# Patient Record
Sex: FEMALE | Race: BLACK OR AFRICAN AMERICAN | NOT HISPANIC OR LATINO | Employment: OTHER | URBAN - METROPOLITAN AREA
[De-identification: names, ages, dates, MRNs, and addresses within clinical notes are randomized per-mention and may not be internally consistent; named-entity substitution may affect disease eponyms.]

---

## 2019-02-25 DIAGNOSIS — I25.10 CORONARY ARTERY DISEASE, ANGINA PRESENCE UNSPECIFIED, UNSPECIFIED VESSEL OR LESION TYPE, UNSPECIFIED WHETHER NATIVE OR TRANSPLANTED HEART: Primary | ICD-10-CM

## 2019-04-02 ENCOUNTER — DOCUMENTATION ONLY (OUTPATIENT)
Dept: CARDIOLOGY | Facility: CLINIC | Age: 49
End: 2019-04-02

## 2019-04-02 ENCOUNTER — OFFICE VISIT (OUTPATIENT)
Dept: CARDIOLOGY | Facility: CLINIC | Age: 49
End: 2019-04-02
Payer: MEDICAID

## 2019-04-02 VITALS
HEIGHT: 63 IN | WEIGHT: 226.19 LBS | OXYGEN SATURATION: 97 % | BODY MASS INDEX: 40.08 KG/M2 | HEART RATE: 93 BPM | SYSTOLIC BLOOD PRESSURE: 117 MMHG | DIASTOLIC BLOOD PRESSURE: 80 MMHG

## 2019-04-02 DIAGNOSIS — I10 HYPERTENSION, UNSPECIFIED TYPE: ICD-10-CM

## 2019-04-02 DIAGNOSIS — E11.42 TYPE 2 DIABETES MELLITUS WITH DIABETIC POLYNEUROPATHY, WITH LONG-TERM CURRENT USE OF INSULIN: ICD-10-CM

## 2019-04-02 DIAGNOSIS — Z79.4 TYPE 2 DIABETES MELLITUS WITH DIABETIC POLYNEUROPATHY, WITH LONG-TERM CURRENT USE OF INSULIN: ICD-10-CM

## 2019-04-02 DIAGNOSIS — I25.110 ATHEROSCLEROSIS OF NATIVE CORONARY ARTERY OF NATIVE HEART WITH UNSTABLE ANGINA PECTORIS: ICD-10-CM

## 2019-04-02 DIAGNOSIS — E66.01 MORBID OBESITY: ICD-10-CM

## 2019-04-02 PROBLEM — E11.9 TYPE 2 DIABETES MELLITUS: Status: ACTIVE | Noted: 2019-04-02

## 2019-04-02 PROCEDURE — 99999 PR PBB SHADOW E&M-EST. PATIENT-LVL III: ICD-10-PCS | Mod: PBBFAC,,, | Performed by: INTERNAL MEDICINE

## 2019-04-02 PROCEDURE — 99213 OFFICE O/P EST LOW 20 MIN: CPT | Mod: PBBFAC | Performed by: INTERNAL MEDICINE

## 2019-04-02 PROCEDURE — 99204 PR OFFICE/OUTPT VISIT, NEW, LEVL IV, 45-59 MIN: ICD-10-PCS | Mod: S$PBB,,, | Performed by: INTERNAL MEDICINE

## 2019-04-02 PROCEDURE — 99999 PR PBB SHADOW E&M-EST. PATIENT-LVL III: CPT | Mod: PBBFAC,,, | Performed by: INTERNAL MEDICINE

## 2019-04-02 PROCEDURE — 99204 OFFICE O/P NEW MOD 45 MIN: CPT | Mod: S$PBB,,, | Performed by: INTERNAL MEDICINE

## 2019-04-02 RX ORDER — INSULIN GLARGINE 100 [IU]/ML
INJECTION, SOLUTION SUBCUTANEOUS
COMMUNITY
Start: 2017-12-21

## 2019-04-02 RX ORDER — HYDROCHLOROTHIAZIDE 25 MG/1
TABLET ORAL
Status: ON HOLD | COMMUNITY
Start: 2018-08-20 | End: 2019-04-04 | Stop reason: HOSPADM

## 2019-04-02 RX ORDER — HYDROCODONE BITARTRATE AND ACETAMINOPHEN 5; 325 MG/1; MG/1
1 TABLET ORAL
Status: ON HOLD | COMMUNITY
Start: 2018-09-05 | End: 2019-04-04 | Stop reason: HOSPADM

## 2019-04-02 RX ORDER — ALBUTEROL SULFATE 5 MG/ML
2.5 SOLUTION RESPIRATORY (INHALATION)
COMMUNITY
Start: 2017-03-29

## 2019-04-02 RX ORDER — RANOLAZINE 1000 MG/1
500 TABLET, EXTENDED RELEASE ORAL 2 TIMES DAILY
COMMUNITY

## 2019-04-02 RX ORDER — CALCIUM CARB/VITAMIN D3/VIT K1 500-100-40
TABLET,CHEWABLE ORAL
Status: ON HOLD | COMMUNITY
Start: 2017-07-25 | End: 2019-04-04 | Stop reason: HOSPADM

## 2019-04-02 RX ORDER — LISINOPRIL 40 MG/1
40 TABLET ORAL DAILY
Refills: 5 | Status: ON HOLD | COMMUNITY
Start: 2019-03-24 | End: 2019-04-04 | Stop reason: HOSPADM

## 2019-04-02 RX ORDER — SODIUM CHLORIDE 9 MG/ML
3 INJECTION, SOLUTION INTRAVENOUS CONTINUOUS
Status: CANCELLED | OUTPATIENT
Start: 2019-04-02 | End: 2019-04-02

## 2019-04-02 RX ORDER — CLOPIDOGREL BISULFATE 75 MG/1
1 TABLET ORAL
COMMUNITY
Start: 2017-12-21

## 2019-04-02 RX ORDER — DIPHENHYDRAMINE HCL 25 MG
50 CAPSULE ORAL ONCE
Status: CANCELLED | OUTPATIENT
Start: 2019-04-02 | End: 2019-04-02

## 2019-04-02 RX ORDER — PROMETHAZINE HYDROCHLORIDE 25 MG/1
25 TABLET ORAL EVERY 6 HOURS PRN
Refills: 1 | COMMUNITY
Start: 2019-03-13

## 2019-04-02 RX ORDER — OMEPRAZOLE 40 MG/1
40 CAPSULE, DELAYED RELEASE ORAL DAILY
Status: ON HOLD | COMMUNITY
End: 2019-04-04 | Stop reason: HOSPADM

## 2019-04-02 RX ORDER — NITROGLYCERIN 0.4 MG/1
0.4 TABLET SUBLINGUAL
COMMUNITY
Start: 2018-04-04

## 2019-04-02 RX ORDER — ATORVASTATIN CALCIUM 80 MG/1
80 TABLET, FILM COATED ORAL
COMMUNITY
Start: 2017-12-21

## 2019-04-02 RX ORDER — LANCETS 33 GAUGE
EACH MISCELLANEOUS
Refills: 3 | Status: ON HOLD | COMMUNITY
Start: 2019-03-10 | End: 2019-04-04 | Stop reason: HOSPADM

## 2019-04-02 RX ORDER — NAPROXEN SODIUM 220 MG/1
1 TABLET, FILM COATED ORAL
COMMUNITY
Start: 2017-12-21

## 2019-04-02 RX ORDER — AMLODIPINE BESYLATE 10 MG/1
1 TABLET ORAL
COMMUNITY
Start: 2018-08-20

## 2019-04-02 RX ORDER — TRAZODONE HYDROCHLORIDE 50 MG/1
50 TABLET ORAL NIGHTLY PRN
Refills: 3 | COMMUNITY
Start: 2019-03-13

## 2019-04-02 RX ORDER — METOPROLOL TARTRATE 50 MG/1
75 TABLET ORAL
COMMUNITY
Start: 2018-04-04

## 2019-04-02 RX ORDER — POTASSIUM CHLORIDE 750 MG/1
10 CAPSULE, EXTENDED RELEASE ORAL ONCE
Status: ON HOLD | COMMUNITY
End: 2019-04-04 | Stop reason: HOSPADM

## 2019-04-02 NOTE — ASSESSMENT & PLAN NOTE
Pt CAD with multiple PCI and multiple episodes of recurrent ISR she was transferred here for brachytherapy.     1. Cardiac catheterization with probable PCI.   2. Antiplatelets: ASA/Plavix  3. Access: Right Radial, 5Fr Slender  4. Pt is a ANIRUDH candidate and understands the importance of taking plavix for at least one year, understands that in case of receiving a drug coated stent the failure to comply with dual anti-platelet therapy is likely to result in stent clothing, heart attack and death.   5. The risks, benefits, and alternatives of coronary vascular angiography and possible intervention were discussed with the patient. All questions were answered and informed consent was obtained. I had a detailed discussion with the patient regarding risk of stroke, MI, bleeding access site complications including limb loss, allergy, kidney failure including dialysis and death.  6. The patient understands the risks and benefits and wishes to go ahead with the procedure.  7. All patient's questions were answered

## 2019-04-02 NOTE — LETTER
April 2, 2019      Shashank Weir MD  [address]  [address]  [facility] [address]           [facility]  [address]  [facility] [address]  Phone: [phone]          Patient: Libia Wynn   MR Number: [id number]   YOB: 1970   Date of Visit: 4/2/2019       Dear Dr. Shashank Weir:    Thank you for referring Libia Wynn to me for evaluation. Attached you will find relevant portions of my assessment and plan of care.    If you have questions, please do not hesitate to call me. I look forward to following Libia Wynn along with you.    Sincerely,    Thad Kirk MD    Enclosure  CC:  No Recipients    If you would like to receive this communication electronically, please contact [email or web address] or [phone] to request more information on [employer] access.    For providers and/or their staff who would like to refer a patient to [facility], please contact us through our one-stop-shop provider referral line, [facility] [place], at [phone].    If you feel you have received this communication in error or would no longer like to receive these types of communications, please e-mail [email or web address]

## 2019-04-02 NOTE — PROGRESS NOTES
Interventional Cardiology Clinic Note  Reason for Visit: Brachytherapy    HPI:   Pt is a 49 year old lady who was referred by Dr. Shashank Weir for Brachytherapy     She has a hx DMII. HTN, Obesity and CAD s/p PCI x 8. She had PCI to LCx in 2010 then had PCI to PLB and LAD in 2016. Had ISR of LAD in 2017 and had a 3.0 Xience placed then. In 2018 had chest pain and was noted to have recurrent ISR of mLAD stents as well as a  of PLB stents so she had laser atherectomy and PTCA of her ISR. Had restenosis in September of 2018 with repeat stenting then in November of 2018 had PCTA. She has recurrent chest pain with exertion and in the middle of the night.    Review of Systems   Constitution: Negative for decreased appetite.   HENT: Negative for congestion and hearing loss.    Eyes: Negative for double vision.   Cardiovascular: Negative for chest pain and palpitations.   Respiratory: Negative for cough and shortness of breath.    Hematologic/Lymphatic: Does not bruise/bleed easily.   Gastrointestinal: Negative for abdominal pain, nausea and vomiting.   Neurological: Negative for dizziness, light-headedness and weakness.       PMH:   HTN, DMII, CAD.    Allergies:   Review of patient's allergies indicates:  No Known Allergies  Medications:     Current Outpatient Medications on File Prior to Visit   Medication Sig Dispense Refill    albuterol (PROVENTIL) 5 mg/mL nebulizer solution Inhale 2.5 mg into the lungs.      amLODIPine (NORVASC) 10 MG tablet Take 1 tablet by mouth.      aspirin 81 MG Chew Take 1 tablet by mouth.      atorvastatin (LIPITOR) 80 MG tablet Take 80 mg by mouth.      calcium-vitamin D (OSCAL) 250 (625)-125 mg-unit per tablet Take 1 tablet by mouth once daily.      clopidogrel (PLAVIX) 75 mg tablet Take 1 tablet by mouth.      hydroCHLOROthiazide (HYDRODIURIL) 25 MG tablet TAKE ONE TABLET BY MOUTH ONCE DAILY      HYDROcodone-acetaminophen (NORCO) 5-325 mg per tablet Take 1 tablet by mouth.    "   insulin glargine (LANTUS U-100 INSULIN) 100 unit/mL injection Inject into the skin.      insulin syringe-needle U-100 0.3 mL 31 gauge x 5/16" Syrg       lancets 33 gauge Misc NEED DIRECTIONS  3    lisinopril (PRINIVIL,ZESTRIL) 40 MG tablet Take 40 mg by mouth once daily.  5    metoprolol tartrate (LOPRESSOR) 50 MG tablet Take 75 mg by mouth.      nitroGLYCERIN (NITROSTAT) 0.4 MG SL tablet Place 0.4 mg under the tongue.      omeprazole (PRILOSEC) 40 MG capsule Take 40 mg by mouth once daily.      potassium chloride (MICRO-K) 10 MEQ CpSR Take 10 mEq by mouth once.      promethazine (PHENERGAN) 25 MG tablet Take 25 mg by mouth every 6 (six) hours as needed.  1    ranolazine (RANEXA) 1,000 mg Tb12 Take 500 mg by mouth 2 (two) times daily.      SITagliptan-metformin (JANUMET) 50-1,000 mg per tablet Take 1 tablet by mouth.      traZODone (DESYREL) 50 MG tablet Take 50 mg by mouth nightly as needed.  3     No current facility-administered medications on file prior to visit.      Social History:     Social History     Tobacco Use    Smoking status: Former Smoker     Last attempt to quit: 2017     Years since quittin.0    Smokeless tobacco: Never Used   Substance Use Topics    Alcohol use: Never     Frequency: Never     Family History:   History reviewed. No pertinent family history.    Physical Exam  /80 (BP Location: Right arm, Patient Position: Sitting, BP Method: Large (Automatic))   Pulse 93   Ht 5' 3" (1.6 m)   Wt 102.6 kg (226 lb 3.1 oz)   SpO2 97%   BMI 40.07 kg/m²    GEN: Alert and oriented in NAD  NECK: no JVD appreciated  CVS: RRR, s1/s2, no MRG  PULM: CTAB no rales  ABD: NT/ND BS +  Extremities: warm and dry, palpable pulses, no edema  NEURO: Alert and oriented x 3  PSYCH: appropriate affect.             Labs:     No results found for: NA, K, CL, CO2, BUN, CREATININE, GLUCOSE, ANIONGAP  No results found for: HGBA1C  No results found for: BNP, BNPTRIAGEBLO No results found for: " WBC, HGB, HCT, PLT, GRAN  No results found for: CHOL, HDL, LDLCALC, TRIG       No results found for: EF    EKG: reviewed    Assessment and Plan  Libia Wynn is a 49 y.o. lady who was referred for brachytherapy    Atherosclerotic heart disease of native coronary artery with unstable angina pectoris  Pt CAD with multiple PCI and multiple episodes of recurrent ISR she was transferred here for brachytherapy.     1. Cardiac catheterization with probable PCI.   2. Antiplatelets: ASA/Plavix  3. Access: Right Radial, 5Fr Slender  4. Pt is a ANIRUDH candidate and understands the importance of taking plavix for at least one year, understands that in case of receiving a drug coated stent the failure to comply with dual anti-platelet therapy is likely to result in stent clothing, heart attack and death.   5. The risks, benefits, and alternatives of coronary vascular angiography and possible intervention were discussed with the patient. All questions were answered and informed consent was obtained. I had a detailed discussion with the patient regarding risk of stroke, MI, bleeding access site complications including limb loss, allergy, kidney failure including dialysis and death.  6. The patient understands the risks and benefits and wishes to go ahead with the procedure.  7. All patient's questions were answered      Type 2 diabetes mellitus  On insulin, doesn't appear well controlled, No A1c in system.    Hypertension  BP is well controlled. Continue medications.     Morbid obesity  Needs weight loss have counseled on diet and weight loss.       Signed:        Alysha Sierra MD  Cardiology Fellow  Pager [phone]    I have personally taken the history and examined this patient. I have discussed and agree with the resident's findings and plan as documented in the resident's note.  Thad Kirk

## 2019-04-02 NOTE — PROGRESS NOTES
OUTPATIENT CATHETERIZATION INSTRUCTIONS    You have been scheduled for a procedure in the catheterization lab on Wednesday, April 3, 2019.     Please report to the Cardiology Waiting Area on the Third floor of the hospital and check in at 7 AM.   You will then be taken to the SSCU (Short Stay Cardiac Unit) and prepared for your procedure. Please be aware that this is not the time of your procedure but the time you are to arrive. The procedures are scheduled on an hourly basis; however, emergency cases take precedence over all other cases.       You may not have anything to eat or drink after midnight the night before your test. You may take your regular morning medications with water. If there are any medications that you should not take you will be instructed to hold them that morning. If you are diabetic and on Metformin (Glucophage) do not take it the day before, the day of, and for 2 days after your procedure.      The procedure will take 1-2 hours to perform. After the procedure, you will return to SSCU on the third floor of the hospital. You will need to lie still (or keep your arm still) for the next 4 to 6 hours to minimize bleeding from the puncture site. Your family may remain in the room with you during this time.       You may be able to be discharged home that same afternoon if there is someone to drive you home and there were no complications. If you have one of the balloon, stent, or device procedures you may spend the night in the hospital. Your doctor will determine, based on your progress, the date and time of your discharge. The results of your procedure will be discussed with you before you are discharged. Any further testing or procedures will be scheduled for you either before you leave or you will be called with these appointments.       If you should have any questions, concerns, or need to change the date of your procedure, please call KELSIE Sahu @ [phone]    Special  Instructions:    Stop Janumet today.        THE ABOVE INSTRUCTIONS WERE GIVEN TO THE PATIENT VERBALLY AND THEY VERBALIZED UNDERSTANDING.  THEY DO NOT REQUIRE ANY SPECIAL NEEDS AND DO NOT HAVE ANY LEARNING BARRIERS.          Directions for Reporting to Cardiology Waiting Area in the Hospital  If you park in the Parking Garage:  Take elevators to the1st floor of the parking garage.  Continue past the gift shop, coffee shop, and piano.  Take a right and go to the gold elevators. (Elevator B)  Take the elevator to the 3rd floor.  Follow the arrow on the sign on the wall that says Cath Lab Registration/EP Lab Registration.  Follow the long hallway all the way around until you come to a big open area.  This is the registration area.  Check in at Reception Desk.    OR    If family is dropping you off:  Have them drop you off at the front of the Hospital under the green overhang.  Enter through the doors and take a right.  Take the E elevators to the 3rd floor Cardiology Waiting Area.  Check in at the Reception Desk in the waiting room.

## 2019-04-02 NOTE — H&P (VIEW-ONLY)
Interventional Cardiology Clinic Note  Reason for Visit: Brachytherapy    HPI:   Pt is a 49 year old lady who was referred by Dr. Shashank Weir for Brachytherapy     She has a hx DMII. HTN, Obesity and CAD s/p PCI x 8. She had PCI to LCx in 2010 then had PCI to PLB and LAD in 2016. Had ISR of LAD in 2017 and had a 3.0 Xience placed then. In 2018 had chest pain and was noted to have recurrent ISR of mLAD stents as well as a  of PLB stents so she had laser atherectomy and PTCA of her ISR. Had restenosis in September of 2018 with repeat stenting then in November of 2018 had PCTA. She has recurrent chest pain with exertion and in the middle of the night.    Review of Systems   Constitution: Negative for decreased appetite.   HENT: Negative for congestion and hearing loss.    Eyes: Negative for double vision.   Cardiovascular: Negative for chest pain and palpitations.   Respiratory: Negative for cough and shortness of breath.    Hematologic/Lymphatic: Does not bruise/bleed easily.   Gastrointestinal: Negative for abdominal pain, nausea and vomiting.   Neurological: Negative for dizziness, light-headedness and weakness.       PMH:   HTN, DMII, CAD.    Allergies:   Review of patient's allergies indicates:  No Known Allergies  Medications:     Current Outpatient Medications on File Prior to Visit   Medication Sig Dispense Refill    albuterol (PROVENTIL) 5 mg/mL nebulizer solution Inhale 2.5 mg into the lungs.      amLODIPine (NORVASC) 10 MG tablet Take 1 tablet by mouth.      aspirin 81 MG Chew Take 1 tablet by mouth.      atorvastatin (LIPITOR) 80 MG tablet Take 80 mg by mouth.      calcium-vitamin D (OSCAL) 250 (625)-125 mg-unit per tablet Take 1 tablet by mouth once daily.      clopidogrel (PLAVIX) 75 mg tablet Take 1 tablet by mouth.      hydroCHLOROthiazide (HYDRODIURIL) 25 MG tablet TAKE ONE TABLET BY MOUTH ONCE DAILY      HYDROcodone-acetaminophen (NORCO) 5-325 mg per tablet Take 1 tablet by mouth.    "   insulin glargine (LANTUS U-100 INSULIN) 100 unit/mL injection Inject into the skin.      insulin syringe-needle U-100 0.3 mL 31 gauge x 5/16" Syrg       lancets 33 gauge Misc NEED DIRECTIONS  3    lisinopril (PRINIVIL,ZESTRIL) 40 MG tablet Take 40 mg by mouth once daily.  5    metoprolol tartrate (LOPRESSOR) 50 MG tablet Take 75 mg by mouth.      nitroGLYCERIN (NITROSTAT) 0.4 MG SL tablet Place 0.4 mg under the tongue.      omeprazole (PRILOSEC) 40 MG capsule Take 40 mg by mouth once daily.      potassium chloride (MICRO-K) 10 MEQ CpSR Take 10 mEq by mouth once.      promethazine (PHENERGAN) 25 MG tablet Take 25 mg by mouth every 6 (six) hours as needed.  1    ranolazine (RANEXA) 1,000 mg Tb12 Take 500 mg by mouth 2 (two) times daily.      SITagliptan-metformin (JANUMET) 50-1,000 mg per tablet Take 1 tablet by mouth.      traZODone (DESYREL) 50 MG tablet Take 50 mg by mouth nightly as needed.  3     No current facility-administered medications on file prior to visit.      Social History:     Social History     Tobacco Use    Smoking status: Former Smoker     Last attempt to quit: 2017     Years since quittin.0    Smokeless tobacco: Never Used   Substance Use Topics    Alcohol use: Never     Frequency: Never     Family History:   History reviewed. No pertinent family history.    Physical Exam  /80 (BP Location: Right arm, Patient Position: Sitting, BP Method: Large (Automatic))   Pulse 93   Ht 5' 3" (1.6 m)   Wt 102.6 kg (226 lb 3.1 oz)   SpO2 97%   BMI 40.07 kg/m²    GEN: Alert and oriented in NAD  NECK: no JVD appreciated  CVS: RRR, s1/s2, no MRG  PULM: CTAB no rales  ABD: NT/ND BS +  Extremities: warm and dry, palpable pulses, no edema  NEURO: Alert and oriented x 3  PSYCH: appropriate affect.             Labs:     No results found for: NA, K, CL, CO2, BUN, CREATININE, GLUCOSE, ANIONGAP  No results found for: HGBA1C  No results found for: BNP, BNPTRIAGEBLO No results found for: " WBC, HGB, HCT, PLT, GRAN  No results found for: CHOL, HDL, LDLCALC, TRIG       No results found for: EF    EKG: reviewed    Assessment and Plan  Libia Wynn is a 49 y.o. lady who was referred for brachytherapy    Atherosclerotic heart disease of native coronary artery with unstable angina pectoris  Pt CAD with multiple PCI and multiple episodes of recurrent ISR she was transferred here for brachytherapy.     1. Cardiac catheterization with probable PCI.   2. Antiplatelets: ASA/Plavix  3. Access: Right Radial, 5Fr Slender  4. Pt is a ANIRUDH candidate and understands the importance of taking plavix for at least one year, understands that in case of receiving a drug coated stent the failure to comply with dual anti-platelet therapy is likely to result in stent clothing, heart attack and death.   5. The risks, benefits, and alternatives of coronary vascular angiography and possible intervention were discussed with the patient. All questions were answered and informed consent was obtained. I had a detailed discussion with the patient regarding risk of stroke, MI, bleeding access site complications including limb loss, allergy, kidney failure including dialysis and death.  6. The patient understands the risks and benefits and wishes to go ahead with the procedure.  7. All patient's questions were answered      Type 2 diabetes mellitus  On insulin, doesn't appear well controlled, No A1c in system.    Hypertension  BP is well controlled. Continue medications.     Morbid obesity  Needs weight loss have counseled on diet and weight loss.       Signed:        Alysha Sierra MD  Cardiology Fellow  Pager [phone]    I have personally taken the history and examined this patient. I have discussed and agree with the resident's findings and plan as documented in the resident's note.  Thad Kirk

## 2019-04-03 ENCOUNTER — DOCUMENTATION ONLY (OUTPATIENT)
Dept: CARDIOLOGY | Facility: CLINIC | Age: 49
End: 2019-04-03

## 2019-04-03 ENCOUNTER — HOSPITAL ENCOUNTER (OUTPATIENT)
Facility: HOSPITAL | Age: 49
Discharge: HOME | End: 2019-04-05
Attending: INTERNAL MEDICINE | Admitting: INTERNAL MEDICINE
Payer: MEDICAID

## 2019-04-03 DIAGNOSIS — I25.110 ATHEROSCLEROSIS OF NATIVE CORONARY ARTERY OF NATIVE HEART WITH UNSTABLE ANGINA PECTORIS: ICD-10-CM

## 2019-04-03 DIAGNOSIS — I25.10 CORONARY ARTERY DISEASE, ANGINA PRESENCE UNSPECIFIED, UNSPECIFIED VESSEL OR LESION TYPE, UNSPECIFIED WHETHER NATIVE OR TRANSPLANTED HEART: ICD-10-CM

## 2019-04-03 DIAGNOSIS — I25.110 ATHEROSCLEROSIS OF NATIVE CORONARY ARTERY OF NATIVE HEART WITH UNSTABLE ANGINA PECTORIS: Primary | ICD-10-CM

## 2019-04-03 DIAGNOSIS — N17.9 AKI (ACUTE KIDNEY INJURY): Primary | ICD-10-CM

## 2019-04-03 LAB
ABO + RH BLD: NORMAL
ANION GAP SERPL CALC-SCNC: 11 MMOL/L (ref 8–16)
ANION GAP SERPL CALC-SCNC: 11 MMOL/L (ref 8–16)
B-HCG UR QL: NEGATIVE
BACTERIA #/AREA URNS AUTO: ABNORMAL /HPF
BASOPHILS # BLD AUTO: 0.02 K/UL (ref 0–0.2)
BASOPHILS NFR BLD: 0.3 % (ref 0–1.9)
BILIRUB UR QL STRIP: NEGATIVE
BLD GP AB SCN CELLS X3 SERPL QL: NORMAL
BUN SERPL-MCNC: 41 MG/DL (ref 6–20)
BUN SERPL-MCNC: 42 MG/DL (ref 6–20)
CALCIUM SERPL-MCNC: 8.5 MG/DL (ref 8.7–10.5)
CALCIUM SERPL-MCNC: 8.8 MG/DL (ref 8.7–10.5)
CHLORIDE SERPL-SCNC: 102 MMOL/L (ref 95–110)
CHLORIDE SERPL-SCNC: 105 MMOL/L (ref 95–110)
CLARITY UR REFRACT.AUTO: ABNORMAL
CO2 SERPL-SCNC: 22 MMOL/L (ref 23–29)
CO2 SERPL-SCNC: 23 MMOL/L (ref 23–29)
COLOR UR AUTO: YELLOW
CREAT SERPL-MCNC: 2.8 MG/DL (ref 0.5–1.4)
CREAT SERPL-MCNC: 3.3 MG/DL (ref 0.5–1.4)
CREAT UR-MCNC: 152 MG/DL (ref 15–325)
CTP QC/QA: YES
DIFFERENTIAL METHOD: ABNORMAL
EOSINOPHIL # BLD AUTO: 0.7 K/UL (ref 0–0.5)
EOSINOPHIL NFR BLD: 9.8 % (ref 0–8)
ERYTHROCYTE [DISTWIDTH] IN BLOOD BY AUTOMATED COUNT: 15 % (ref 11.5–14.5)
EST. GFR  (AFRICAN AMERICAN): 17.9 ML/MIN/1.73 M^2
EST. GFR  (AFRICAN AMERICAN): 21.9 ML/MIN/1.73 M^2
EST. GFR  (NON AFRICAN AMERICAN): 15.5 ML/MIN/1.73 M^2
EST. GFR  (NON AFRICAN AMERICAN): 19 ML/MIN/1.73 M^2
GLUCOSE SERPL-MCNC: 129 MG/DL (ref 70–110)
GLUCOSE SERPL-MCNC: 87 MG/DL (ref 70–110)
GLUCOSE UR QL STRIP: NEGATIVE
HCT VFR BLD AUTO: 33.9 % (ref 37–48.5)
HGB BLD-MCNC: 11 G/DL (ref 12–16)
HGB UR QL STRIP: ABNORMAL
IMM GRANULOCYTES # BLD AUTO: 0.04 K/UL (ref 0–0.04)
IMM GRANULOCYTES NFR BLD AUTO: 0.6 % (ref 0–0.5)
KETONES UR QL STRIP: NEGATIVE
LEUKOCYTE ESTERASE UR QL STRIP: ABNORMAL
LYMPHOCYTES # BLD AUTO: 1.7 K/UL (ref 1–4.8)
LYMPHOCYTES NFR BLD: 24.4 % (ref 18–48)
MCH RBC QN AUTO: 28.9 PG (ref 27–31)
MCHC RBC AUTO-ENTMCNC: 32.4 G/DL (ref 32–36)
MCV RBC AUTO: 89 FL (ref 82–98)
MICROSCOPIC COMMENT: ABNORMAL
MONOCYTES # BLD AUTO: 0.6 K/UL (ref 0.3–1)
MONOCYTES NFR BLD: 9 % (ref 4–15)
NEUTROPHILS # BLD AUTO: 3.9 K/UL (ref 1.8–7.7)
NEUTROPHILS NFR BLD: 55.9 % (ref 38–73)
NITRITE UR QL STRIP: NEGATIVE
NRBC BLD-RTO: 0 /100 WBC
PH UR STRIP: 5 [PH] (ref 5–8)
PLATELET # BLD AUTO: 355 K/UL (ref 150–350)
PMV BLD AUTO: 10.6 FL (ref 9.2–12.9)
POCT GLUCOSE: 150 MG/DL (ref 70–110)
POCT GLUCOSE: 174 MG/DL (ref 70–110)
POCT GLUCOSE: 74 MG/DL (ref 70–110)
POTASSIUM SERPL-SCNC: 4.4 MMOL/L (ref 3.5–5.1)
POTASSIUM SERPL-SCNC: 4.5 MMOL/L (ref 3.5–5.1)
PROT UR QL STRIP: NEGATIVE
PROT UR-MCNC: 33 MG/DL (ref 0–15)
PROT/CREAT UR: 0.22 MG/G{CREAT} (ref 0–0.2)
RBC # BLD AUTO: 3.8 M/UL (ref 4–5.4)
RBC #/AREA URNS AUTO: >100 /HPF (ref 0–4)
SODIUM SERPL-SCNC: 136 MMOL/L (ref 136–145)
SODIUM SERPL-SCNC: 138 MMOL/L (ref 136–145)
SODIUM UR-SCNC: 44 MMOL/L (ref 20–250)
SP GR UR STRIP: 1.01 (ref 1–1.03)
SQUAMOUS #/AREA URNS AUTO: 2 /HPF
URN SPEC COLLECT METH UR: ABNORMAL
WBC # BLD AUTO: 7.02 K/UL (ref 3.9–12.7)
WBC #/AREA URNS AUTO: 74 /HPF (ref 0–5)
WBC CLUMPS UR QL AUTO: ABNORMAL

## 2019-04-03 PROCEDURE — 99214 OFFICE O/P EST MOD 30 MIN: CPT | Mod: ,,, | Performed by: NURSE PRACTITIONER

## 2019-04-03 PROCEDURE — 82962 GLUCOSE BLOOD TEST: CPT

## 2019-04-03 PROCEDURE — 84156 ASSAY OF PROTEIN URINE: CPT

## 2019-04-03 PROCEDURE — 81025 URINE PREGNANCY TEST: CPT | Performed by: INTERNAL MEDICINE

## 2019-04-03 PROCEDURE — 99212 PR OFFICE/OUTPT VISIT, EST, LEVL II, 10-19 MIN: ICD-10-PCS | Mod: ,,, | Performed by: RADIOLOGY

## 2019-04-03 PROCEDURE — 99212 OFFICE O/P EST SF 10 MIN: CPT | Mod: ,,, | Performed by: RADIOLOGY

## 2019-04-03 PROCEDURE — 80048 BASIC METABOLIC PNL TOTAL CA: CPT

## 2019-04-03 PROCEDURE — 25000003 PHARM REV CODE 250: Performed by: STUDENT IN AN ORGANIZED HEALTH CARE EDUCATION/TRAINING PROGRAM

## 2019-04-03 PROCEDURE — 99214 PR OFFICE/OUTPT VISIT, EST, LEVL IV, 30-39 MIN: ICD-10-PCS | Mod: ,,, | Performed by: NURSE PRACTITIONER

## 2019-04-03 PROCEDURE — 84300 ASSAY OF URINE SODIUM: CPT

## 2019-04-03 PROCEDURE — 80048 BASIC METABOLIC PNL TOTAL CA: CPT | Mod: 91

## 2019-04-03 PROCEDURE — G0378 HOSPITAL OBSERVATION PER HR: HCPCS

## 2019-04-03 PROCEDURE — 86850 RBC ANTIBODY SCREEN: CPT

## 2019-04-03 PROCEDURE — 87086 URINE CULTURE/COLONY COUNT: CPT

## 2019-04-03 PROCEDURE — 85025 COMPLETE CBC W/AUTO DIFF WBC: CPT

## 2019-04-03 PROCEDURE — 81001 URINALYSIS AUTO W/SCOPE: CPT

## 2019-04-03 PROCEDURE — 36415 COLL VENOUS BLD VENIPUNCTURE: CPT

## 2019-04-03 RX ORDER — ATORVASTATIN CALCIUM 20 MG/1
80 TABLET, FILM COATED ORAL DAILY
Status: DISCONTINUED | OUTPATIENT
Start: 2019-04-04 | End: 2019-04-05 | Stop reason: HOSPADM

## 2019-04-03 RX ORDER — IBUPROFEN 200 MG
16 TABLET ORAL
Status: DISCONTINUED | OUTPATIENT
Start: 2019-04-03 | End: 2019-04-05 | Stop reason: HOSPADM

## 2019-04-03 RX ORDER — GLUCAGON 1 MG
1 KIT INJECTION
Status: DISCONTINUED | OUTPATIENT
Start: 2019-04-03 | End: 2019-04-05 | Stop reason: HOSPADM

## 2019-04-03 RX ORDER — DIPHENHYDRAMINE HCL 25 MG
50 CAPSULE ORAL ONCE
Status: COMPLETED | OUTPATIENT
Start: 2019-04-03 | End: 2019-04-03

## 2019-04-03 RX ORDER — ASPIRIN 81 MG/1
81 TABLET ORAL DAILY
Status: DISCONTINUED | OUTPATIENT
Start: 2019-04-04 | End: 2019-04-05 | Stop reason: HOSPADM

## 2019-04-03 RX ORDER — PROMETHAZINE HYDROCHLORIDE 12.5 MG/1
25 TABLET ORAL ONCE
Status: COMPLETED | OUTPATIENT
Start: 2019-04-03 | End: 2019-04-03

## 2019-04-03 RX ORDER — IBUPROFEN 200 MG
24 TABLET ORAL
Status: DISCONTINUED | OUTPATIENT
Start: 2019-04-03 | End: 2019-04-05 | Stop reason: HOSPADM

## 2019-04-03 RX ORDER — SODIUM CHLORIDE 9 MG/ML
3 INJECTION, SOLUTION INTRAVENOUS CONTINUOUS
Status: ACTIVE | OUTPATIENT
Start: 2019-04-03 | End: 2019-04-03

## 2019-04-03 RX ORDER — INSULIN ASPART 100 [IU]/ML
0-5 INJECTION, SOLUTION INTRAVENOUS; SUBCUTANEOUS
Status: DISCONTINUED | OUTPATIENT
Start: 2019-04-03 | End: 2019-04-05 | Stop reason: HOSPADM

## 2019-04-03 RX ORDER — SODIUM CHLORIDE 9 MG/ML
INJECTION, SOLUTION INTRAVENOUS CONTINUOUS
Status: ACTIVE | OUTPATIENT
Start: 2019-04-03 | End: 2019-04-03

## 2019-04-03 RX ORDER — SODIUM CHLORIDE 9 MG/ML
INJECTION, SOLUTION INTRAVENOUS CONTINUOUS
Status: DISCONTINUED | OUTPATIENT
Start: 2019-04-03 | End: 2019-04-05 | Stop reason: HOSPADM

## 2019-04-03 RX ORDER — CLOPIDOGREL BISULFATE 75 MG/1
75 TABLET ORAL DAILY
Status: DISCONTINUED | OUTPATIENT
Start: 2019-04-04 | End: 2019-04-05 | Stop reason: HOSPADM

## 2019-04-03 RX ADMIN — SODIUM CHLORIDE: 0.9 INJECTION, SOLUTION INTRAVENOUS at 04:04

## 2019-04-03 RX ADMIN — SODIUM CHLORIDE 3 ML/KG/HR: 0.9 INJECTION, SOLUTION INTRAVENOUS at 08:04

## 2019-04-03 RX ADMIN — SODIUM CHLORIDE: 9 INJECTION, SOLUTION INTRAVENOUS at 08:04

## 2019-04-03 RX ADMIN — PROMETHAZINE HYDROCHLORIDE 25 MG: 12.5 TABLET ORAL at 02:04

## 2019-04-03 RX ADMIN — DIPHENHYDRAMINE HYDROCHLORIDE 50 MG: 25 CAPSULE ORAL at 08:04

## 2019-04-03 NOTE — PLAN OF CARE
Problem: Adult Inpatient Plan of Care  Goal: Plan of Care Review  Outcome: Ongoing (interventions implemented as appropriate)  Pt transferred to u/s via stretcher with escort. Pt is aaox4. Vss. resp even and unlabored. No distress noted.

## 2019-04-03 NOTE — PLAN OF CARE
Problem: Adult Inpatient Plan of Care  Goal: Patient-Specific Goal (Individualization)  Outcome: Ongoing (interventions implemented as appropriate)  Bladder scan performed and showed 28 cc's of urine.  Will continue to monitor

## 2019-04-03 NOTE — CONSULTS
[facility]  Radiation Oncology  Consult Note    Patient Name: Libia Wynn  MRN: [id number]  Admission Date: 4/3/2019  Hospital Length of Stay: 0 days  Code Status: No Order   Attending Provider: Thad Kirk MD  Consulting Provider: Jaylen Swann MD  Primary Care Physician: Primary Doctor No  Principal Problem:<principal problem not specified>    Inpatient consult to Radiation Oncology  Consult performed by: Jaylen Swann MD  Consult ordered by: Alysha Sierra MD        Subjective:     HPI:  REFERRING PHYSICIAN: Thad Campos MD    PROBLEM: Ms Wynn is a 49 years old woman suspected of having restenosis in a stented coronary artery and who may benefit from endovascular irradiation at the time of planned percutaneous coronary angioplasty  to reduce the chance of another restenosis.    The expected benefit, risk and logistics of endovascular irradiation were discussed with patient. She understands and signs the consent.     PLAN: The PCA is scheduled for today.      No new subjective & objective note has been filed under this hospital service since the last note was generated.    Assessment/Plan:         Thank you for your consult.     Jaylen Swann MD  Radiation Oncology  [facility]

## 2019-04-03 NOTE — CONSULTS
"[facility]  Nephrology  Consult Note    Patient Name: Libia Wynn  MRN: [id number]  Admission Date: 4/3/2019  Hospital Length of Stay: 0 days  Attending Provider: Thad Kirk MD   Primary Care Physician: Primary Doctor No  Principal Problem:<principal problem not specified>    Inpatient consult to Nephrology  Consult performed by: Rolo Norman NP  Consult ordered by: Navneet Lee MD  Reason for consult: KRIS        Subjective:     HPI: Ms. Wynn is a 50 yo AAF with DM2, HTN, morbid obesity and significant CAD s/p PCI x 8 with recurrent stenosis.  Most recently had restenosis in September with repeat stening in November '18 and had PCTA.  She was scheduled to have C with PCI with possible brachytherapy, but AM labs revealed a significant decline in kidney function.  Procedure has been canceled and Nephrology has been consulted for KRIS.  She reports having poor PO intake for the entire month of March, barely able to drink water without vomiting.  Home medications included lisinopril along with HCTZ.  She has noticed a decline in her UOP for the past several weeks, noting that she was not waking up in the middle of the night like she normally does to urinate.  She also reports a darkening of color of her urine.  She is compliant with her medication adhearance, and usually takes her blood pressures at home with baseline pressures being around 110-120/87.  She has not noticed any changes in this, but does endorse dizziness with position changes for the past several weeks.  Denies and syncope, but does report increased CP/SOB on exertion.      Care everywhere reveals a normal SCr back in February of '18 was around 1.0. At her labs performed yesterday at her preop apt, noted to have a Scr of 1.8 which increased to 3.3 on AM labs on consultation.  She reports feeling "off" at her clinic appointment and Tired today when she arrived to the hospital.  She was found to have a blood " pressure of 79/50 on admission, which improved with 1L NS bolus.  She did take her scheduled medications prior to admission.  Blood pressures have since improved, and patient denies any dizziness, CP or SOB on examination.      Past Medical History:   Diagnosis Date    Arthritis     COPD (chronic obstructive pulmonary disease)     Coronary artery disease     Diabetes mellitus     Hypertension        Past Surgical History:   Procedure Laterality Date    CARDIAC CATHETERIZATION         Review of patient's allergies indicates:  No Known Allergies  No current facility-administered medications for this encounter.      Family History     None        Tobacco Use    Smoking status: Former Smoker     Last attempt to quit: 2017     Years since quittin.0    Smokeless tobacco: Never Used   Substance and Sexual Activity    Alcohol use: Never     Frequency: Never    Drug use: Never    Sexual activity: Not on file     Review of Systems   Constitutional: Positive for activity change, appetite change and fatigue. Negative for chills and fever.   HENT: Negative for congestion, facial swelling, postnasal drip, rhinorrhea and sinus pressure.    Respiratory: Negative for cough, chest tightness, shortness of breath and wheezing.    Cardiovascular: Negative for chest pain, palpitations and leg swelling.   Gastrointestinal: Negative for abdominal distention, constipation, diarrhea and nausea.   Genitourinary: Positive for decreased urine volume and difficulty urinating. Negative for hematuria and urgency.   Musculoskeletal: Negative for arthralgias, gait problem and myalgias.   Skin: Negative for color change, pallor and rash.   Neurological: Negative for dizziness, light-headedness and headaches.   Psychiatric/Behavioral: Negative for agitation, behavioral problems and confusion.     Objective:     Vital Signs (Most Recent):  Temp: 97.7 °F (36.5 °C) (19 0752)  Pulse: 69 (19 1416)  Resp: 18 (19  1416)  BP: 97/63 (04/03/19 1416)  SpO2: 97 % (04/03/19 0752)  O2 Device (Oxygen Therapy): room air (04/03/19 0752) Vital Signs (24h Range):  Temp:  [97.7 °F (36.5 °C)] 97.7 °F (36.5 °C)  Pulse:  [68-93] 69  Resp:  [18] 18  SpO2:  [97 %] 97 %  BP: ()/(50-80) 97/63     Weight: 102.5 kg (226 lb) (04/03/19 0752)  Body mass index is 40.03 kg/m².  Body surface area is 2.13 meters squared.    No intake/output data recorded.    Physical Exam   Constitutional: She is oriented to person, place, and time. She appears well-developed. No distress.   HENT:   Head: Normocephalic and atraumatic.   Right Ear: External ear normal.   Left Ear: External ear normal.   Eyes: Conjunctivae and EOM are normal. Right eye exhibits no discharge. Left eye exhibits no discharge.   Cardiovascular: Normal rate and regular rhythm. Exam reveals no gallop and no friction rub.   No murmur heard.  Pulmonary/Chest: Effort normal and breath sounds normal. No respiratory distress. She has no wheezes. She has no rales.   Abdominal: Soft. Bowel sounds are normal. She exhibits no distension. There is no tenderness.   Musculoskeletal: Normal range of motion. She exhibits no edema or deformity.   Neurological: She is alert and oriented to person, place, and time.   Skin: Skin is warm and dry. She is not diaphoretic.   Psychiatric: She has a normal mood and affect.       Significant Labs:  CBC:   Recent Labs   Lab 04/03/19  0712   WBC 7.02   RBC 3.80*   HGB 11.0*   HCT 33.9*   *   MCV 89   MCH 28.9   MCHC 32.4     CMP:   Recent Labs   Lab 04/03/19  0712   *   CALCIUM 8.8      K 4.4   CO2 23      BUN 42*   CREATININE 3.3*           Assessment/Plan:     KRIS (acute kidney injury)  KRIS on thought to be normal kidney function    48 yo AAF with significant CAD who presents for [facility] with PCI found to have worsening kidney function on AM labs.  She took her scheduled medications prior to arriving to the hospital, reporting that she felt  ""off" and found to be hypotensive with BP reading of 79/50.  She was administered 1L of NS with improvement and LHC was cancelled.  Nephrology has been consulted for KRIS.    KRIS likely 2/2 Ischemic ATN from pre-renal state in setting of ACE-I use and HCTZ vs. Hypotensive episode on admission.  She reports having poor po intake during the month of March with increased nausea and vomiting while she was taking HCTZ and lisinopril.  She was also found to have symptomatic hypotension with SBP in the 70's.    Plan/Recommendations:  -Urinalysis  -UPCR  -urine Na  -renal US  -urine for microscopy.  -recommend TTE  -strict I/O's  -would avoid contrasted studies at this time unless urgently indicated and benefits outweigh her risk.  High risk of developing dialysis dependent KRIS.      Rolo Santacruz, ERIC  Nephrology  [facility]  "

## 2019-04-03 NOTE — PLAN OF CARE
Problem: Adult Inpatient Plan of Care  Goal: Plan of Care Review  Outcome: Ongoing (interventions implemented as appropriate)  Pt returned to floor via stretcher from [facility]. Pt is aaox4. Vss. resp even and unlabored. Bed locked and in low position. Side rails raised x 2. Family  Members at bedside. Nurse call bell within reach. Will continue to monitor

## 2019-04-03 NOTE — HPI
"Ms. Wynn is a 48 yo AAF with DM2, HTN, morbid obesity and significant CAD s/p PCI x 8 with recurrent stenosis.  Most recently had restenosis in September with repeat stening in November '18 and had PCTA.  She was scheduled to have [facility] with PCI with possible brachytherapy, but AM labs revealed a significant decline in kidney function.  Procedure has been canceled and Nephrology has been consulted for KRIS.  She reports having poor PO intake for the entire month of March, barely able to drink water without vomiting.  Home medications included lisinopril along with HCTZ.  She has noticed a decline in her UOP for the past several weeks, noting that she was not waking up in the middle of the night like she normally does to urinate.  She also reports a darkening of color of her urine.  She is compliant with her medication adhearance, and usually takes her blood pressures at home with baseline pressures being around 110-120/87.  She has not noticed any changes in this, but does endorse dizziness with position changes for the past several weeks.  Denies and syncope, but does report increased CP/SOB on exertion.      Care everywhere reveals a normal SCr back in February of '18 was around 1.0. At her labs performed yesterday at her preop apt, noted to have a Scr of 1.8 which increased to 3.3 on AM labs on consultation.  She reports feeling "off" at her clinic appointment and Tired today when she arrived to the hospital.  She was found to have a blood pressure of 79/50 on admission, which improved with 1L NS bolus.  She did take her scheduled medications prior to admission.  Blood pressures have since improved, and patient denies any dizziness, CP or SOB on examination.    "

## 2019-04-03 NOTE — ASSESSMENT & PLAN NOTE
"KRIS on thought to be normal kidney function    50 yo AAF with significant CAD who presents for LHC with PCI found to have worsening kidney function on AM labs.  She took her scheduled medications prior to arriving to the hospital, reporting that she felt "off" and found to be hypotensive with BP reading of 79/50.  She was administered 1L of NS with improvement and LHC was cancelled.  Nephrology has been consulted for KRIS.    KRIS likely 2/2 Ischemic ATN from pre-renal state in setting of ACE-I use and HCTZ vs. Hypotensive episode on admission.  She reports having poor po intake during the month of March with increased nausea and vomiting while she was taking HCTZ and lisinopril.  She was also found to have symptomatic hypotension with SBP in the 70's.    Plan/Recommendations:  -Urinalysis  -UPCR  -urine Na  -renal US  -urine for microscopy.  -recommend TTE  -strict I/O's  -would avoid contrasted studies at this time unless urgently indicated and benefits outweigh her risk.  High risk of developing dialysis dependent KRIS.  "

## 2019-04-03 NOTE — NURSING TRANSFER
Nursing Transfer Note      4/3/2019     Transfer from SSCU room 6 to 1160A   Transfer via wheelchair    Transfer with continuous tele monitoring    Transported by Mary Ellen Garcia RN    Medicines sent: no     Chart send with patient: yes    Notified: famiy members accompanied pt upon transport    Patient reassessed at: 4/3/19 @1720     Upon arrival to floor: KELSIE Mcdermott (receiving nurse) notified of patient's arrival and at bedside. Pt is aaox4. Vss. resp even and unlabored.

## 2019-04-03 NOTE — HPI
REFERRING PHYSICIAN: Thad Campos MD    PROBLEM: Ms yWnn is a 49 years old woman suspected of having restenosis in a stented coronary artery and who may benefit from endovascular irradiation at the time of planned percutaneous coronary angioplasty  to reduce the chance of another restenosis.    The expected benefit, risk and logistics of endovascular irradiation were discussed with patient. She understands and signs the consent.     PLAN: The PCA is scheduled for today.

## 2019-04-03 NOTE — PLAN OF CARE
"Problem: Adult Inpatient Plan of Care  Goal: Plan of Care Review  Outcome: Ongoing (interventions implemented as appropriate)  B/p in the 70's/50's and pt is c/o feeling "tire."  Pt states she took all of her am b/p meds and a Phenergan for nausea prior to arrival to the hospital. Dr Sierra notified. New order given and carried out for  cc iv bolus x 1 now. Pt on continuous b/p monitoring. Nurse call bell within reach. Will continue to monitor      "

## 2019-04-03 NOTE — PLAN OF CARE
Problem: Adult Inpatient Plan of Care  Goal: Plan of Care Review  Outcome: Ongoing (interventions implemented as appropriate)  Pt having states she is having difficulty urinating. Dr Kirk and Dr Lee at bedside and aware. B/p in the 80's/50's, pt is aaox4. Vss. resp even and unlabored.  pt states she just feels tired.New orders given and will be carried out to administer 1Liter NS and to scan bladder. Will continue to monitor

## 2019-04-03 NOTE — PROGRESS NOTES
OUTPATIENT CATHETERIZATION INSTRUCTIONS    You have been scheduled for a procedure in the catheterization lab on Wednesday, May 8, 2019.     Please report to the Cardiology Waiting Area on the Third floor of the hospital and check in at 6 AM.   You will then be taken to the SSCU (Short Stay Cardiac Unit) and prepared for your procedure. Please be aware that this is not the time of your procedure but the time you are to arrive. The procedures are scheduled on an hourly basis; however, emergency cases take precedence over all other cases.       You may not have anything to eat or drink after midnight the night before your test. You may take your regular morning medications with water. If there are any medications that you should not take you will be instructed to hold them that morning. If you are diabetic and on Metformin (Glucophage) do not take it the day before, the day of, and for 2 days after your procedure.      The procedure will take 1-2 hours to perform. After the procedure, you will return to SSCU on the third floor of the hospital. You will need to lie still (or keep your arm still) for the next 4 to 6 hours to minimize bleeding from the puncture site. Your family may remain in the room with you during this time.       You may be able to be discharged home that same afternoon if there is someone to drive you home and there were no complications. If you have one of the balloon, stent, or device procedures you may spend the night in the hospital. Your doctor will determine, based on your progress, the date and time of your discharge. The results of your procedure will be discussed with you before you are discharged. Any further testing or procedures will be scheduled for you either before you leave or you will be called with these appointments.       If you should have any questions, concerns, or need to change the date of your procedure, please call KELSIE Sahu @ [phone]    Special  Instructions:    Drink plenty of fluids the day before your procedure.    Stop Janumet on Tuesday May 7, 2019.        THE ABOVE INSTRUCTIONS WERE GIVEN TO THE PATIENT VERBALLY AND THEY VERBALIZED UNDERSTANDING.  THEY DO NOT REQUIRE ANY SPECIAL NEEDS AND DO NOT HAVE ANY LEARNING BARRIERS.          Directions for Reporting to Cardiology Waiting Area in the Hospital  If you park in the Parking Garage:  Take elevators to the1st floor of the parking garage.  Continue past the gift shop, coffee shop, and piano.  Take a right and go to the gold elevators. (Elevator B)  Take the elevator to the 3rd floor.  Follow the arrow on the sign on the wall that says Cath Lab Registration/EP Lab Registration.  Follow the long hallway all the way around until you come to a big open area.  This is the registration area.  Check in at Reception Desk.    OR    If family is dropping you off:  Have them drop you off at the front of the Hospital under the green overhang.  Enter through the doors and take a right.  Take the E elevators to the 3rd floor Cardiology Waiting Area.  Check in at the Reception Desk in the waiting room.

## 2019-04-04 ENCOUNTER — CLINICAL SUPPORT (OUTPATIENT)
Dept: CARDIOLOGY | Facility: CLINIC | Age: 49
End: 2019-04-04
Attending: INTERNAL MEDICINE
Payer: MEDICAID

## 2019-04-04 LAB
ABDOMINAL AORTA MID EDV: 0 CM/S
ABDOMINAL AORTA MID PSV: 55 CM/S
ALBUMIN SERPL BCP-MCNC: 3.2 G/DL (ref 3.5–5.2)
ALP SERPL-CCNC: 56 U/L (ref 55–135)
ALT SERPL W/O P-5'-P-CCNC: 9 U/L (ref 10–44)
ANION GAP SERPL CALC-SCNC: 7 MMOL/L (ref 8–16)
AST SERPL-CCNC: 11 U/L (ref 10–40)
BACTERIA UR CULT: NO GROWTH
BILIRUB SERPL-MCNC: 0.7 MG/DL (ref 0.1–1)
BUN SERPL-MCNC: 24 MG/DL (ref 6–20)
CALCIUM SERPL-MCNC: 8.6 MG/DL (ref 8.7–10.5)
CHLORIDE SERPL-SCNC: 107 MMOL/L (ref 95–110)
CO2 SERPL-SCNC: 24 MMOL/L (ref 23–29)
CREAT SERPL-MCNC: 1.4 MG/DL (ref 0.5–1.4)
EST. GFR  (AFRICAN AMERICAN): 50.5 ML/MIN/1.73 M^2
EST. GFR  (NON AFRICAN AMERICAN): 43.8 ML/MIN/1.73 M^2
ESTIMATED AVG GLUCOSE: 169 MG/DL (ref 68–131)
GLUCOSE SERPL-MCNC: 136 MG/DL (ref 70–110)
HBA1C MFR BLD HPLC: 7.5 % (ref 4–5.6)
LEFT RENAL DIST DIAS: 21 CM/S
LEFT RENAL DIST SYS: 107 CM/S
LEFT RENAL ULTRASOUND ACCELERATION TIME MEASUREMENT 1: 106 MS
LEFT RENAL ULTRASOUND ACCELERATION TIME MEASUREMENT 2: 83 MS
LEFT RENAL ULTRASOUND ACCELERATION TIME MEASUREMENT 3: 108 MS
LEFT RENAL ULTRASOUND ACCELERATION TIME MEASUREMENT AVERAGE: 108 MS
LEFT RENAL ULTRASOUND KIDNEY SIZE MEASUREMENT 1: 11.1 CM
LEFT RENAL ULTRASOUND KIDNEY SIZE MEASUREMENT 2: 11.1 CM
LEFT RENAL ULTRASOUND KIDNEY SIZE MEASUREMENT 3: 11.1 CM
LEFT RENAL ULTRASOUND KIDNEY SIZE MEASUREMENT AVERAGE: 11.1 CM
LEFT RENAL ULTRASOUND RESISTIVE INDEX MEASUREMENT 1: 0.74
LEFT RENAL ULTRASOUND RESISTIVE INDEX MEASUREMENT 2: 0.74
LEFT RENAL ULTRASOUND RESISTIVE INDEX MEASUREMENT 3: 0.75
LEFT RENAL ULTRASOUND RESISTIVE INDEX MEASUREMENT AVERAGE: 0.75
OHS CV LEFT RENAL RAR: 1.95
OHS CV US LEFT RENAL HIGHEST EDV: 21
OHS CV US LEFT RENAL HIGHEST PSV: 107
POCT GLUCOSE: 137 MG/DL (ref 70–110)
POCT GLUCOSE: 144 MG/DL (ref 70–110)
POCT GLUCOSE: 172 MG/DL (ref 70–110)
POCT GLUCOSE: 180 MG/DL (ref 70–110)
POTASSIUM SERPL-SCNC: 3.7 MMOL/L (ref 3.5–5.1)
PROT SERPL-MCNC: 6.5 G/DL (ref 6–8.4)
RIGHT RENAL ULTRASOUND ACCELERATION TIME MEASUREMENT 1: 160 MS
RIGHT RENAL ULTRASOUND ACCELERATION TIME MEASUREMENT 2: 134 MS
RIGHT RENAL ULTRASOUND ACCELERATION TIME MEASUREMENT AVERAGE: 160 MS
RIGHT RENAL ULTRASOUND KIDNEY SIZE MEASUREMENT 1: 10.6 CM
RIGHT RENAL ULTRASOUND KIDNEY SIZE MEASUREMENT 2: 10.4 CM
RIGHT RENAL ULTRASOUND KIDNEY SIZE MEASUREMENT 3: 10.4 CM
RIGHT RENAL ULTRASOUND KIDNEY SIZE MEASUREMENT AVERAGE: 10.6 CM
RIGHT RENAL ULTRASOUND RESISTIVE INDEX MEASUREMENT 1: 0.76
RIGHT RENAL ULTRASOUND RESISTIVE INDEX MEASUREMENT 2: 0.71
RIGHT RENAL ULTRASOUND RESISTIVE INDEX MEASUREMENT AVERAGE: 0.76
SODIUM SERPL-SCNC: 138 MMOL/L (ref 136–145)

## 2019-04-04 PROCEDURE — 80053 COMPREHEN METABOLIC PANEL: CPT

## 2019-04-04 PROCEDURE — 25000003 PHARM REV CODE 250: Performed by: STUDENT IN AN ORGANIZED HEALTH CARE EDUCATION/TRAINING PROGRAM

## 2019-04-04 PROCEDURE — 99226 PR SUBSEQUENT OBSERVATION CARE,LEVEL III: ICD-10-PCS | Mod: ,,, | Performed by: INTERNAL MEDICINE

## 2019-04-04 PROCEDURE — 63600175 PHARM REV CODE 636 W HCPCS: Performed by: STUDENT IN AN ORGANIZED HEALTH CARE EDUCATION/TRAINING PROGRAM

## 2019-04-04 PROCEDURE — 93975 VASCULAR STUDY: CPT | Mod: 26,,, | Performed by: INTERNAL MEDICINE

## 2019-04-04 PROCEDURE — 93975 VASCULAR STUDY: CPT | Mod: 50

## 2019-04-04 PROCEDURE — 93975 CV US RENAL ARTERY STENOSIS HYPERTENSION COMPLETE (CUPID ONLY): ICD-10-PCS | Mod: 26,,, | Performed by: INTERNAL MEDICINE

## 2019-04-04 PROCEDURE — 99226 PR SUBSEQUENT OBSERVATION CARE,LEVEL III: CPT | Mod: ,,, | Performed by: INTERNAL MEDICINE

## 2019-04-04 PROCEDURE — G0378 HOSPITAL OBSERVATION PER HR: HCPCS

## 2019-04-04 PROCEDURE — 36415 COLL VENOUS BLD VENIPUNCTURE: CPT

## 2019-04-04 PROCEDURE — 83036 HEMOGLOBIN GLYCOSYLATED A1C: CPT

## 2019-04-04 PROCEDURE — 99214 OFFICE O/P EST MOD 30 MIN: CPT | Mod: ,,, | Performed by: INTERNAL MEDICINE

## 2019-04-04 PROCEDURE — 99214 PR OFFICE/OUTPT VISIT, EST, LEVL IV, 30-39 MIN: ICD-10-PCS | Mod: ,,, | Performed by: INTERNAL MEDICINE

## 2019-04-04 RX ORDER — NITROGLYCERIN 0.4 MG/1
0.4 TABLET SUBLINGUAL EVERY 5 MIN PRN
Status: DISCONTINUED | OUTPATIENT
Start: 2019-04-04 | End: 2019-04-05 | Stop reason: HOSPADM

## 2019-04-04 RX ORDER — METOPROLOL TARTRATE 50 MG/1
50 TABLET ORAL 2 TIMES DAILY
Status: DISCONTINUED | OUTPATIENT
Start: 2019-04-04 | End: 2019-04-05 | Stop reason: HOSPADM

## 2019-04-04 RX ORDER — AMLODIPINE BESYLATE 10 MG/1
10 TABLET ORAL DAILY
Status: DISCONTINUED | OUTPATIENT
Start: 2019-04-04 | End: 2019-04-05 | Stop reason: HOSPADM

## 2019-04-04 RX ORDER — CALCIUM CARBONATE 200(500)MG
500 TABLET,CHEWABLE ORAL ONCE
Status: COMPLETED | OUTPATIENT
Start: 2019-04-04 | End: 2019-04-04

## 2019-04-04 RX ORDER — RANOLAZINE 500 MG/1
1000 TABLET, EXTENDED RELEASE ORAL 2 TIMES DAILY
Status: DISCONTINUED | OUTPATIENT
Start: 2019-04-04 | End: 2019-04-05 | Stop reason: HOSPADM

## 2019-04-04 RX ORDER — ONDANSETRON 2 MG/ML
4 INJECTION INTRAMUSCULAR; INTRAVENOUS ONCE
Status: COMPLETED | OUTPATIENT
Start: 2019-04-04 | End: 2019-04-04

## 2019-04-04 RX ORDER — ISOSORBIDE MONONITRATE 30 MG/1
30 TABLET, EXTENDED RELEASE ORAL DAILY
Status: DISCONTINUED | OUTPATIENT
Start: 2019-04-04 | End: 2019-04-05 | Stop reason: HOSPADM

## 2019-04-04 RX ADMIN — ASPIRIN 81 MG: 81 TABLET, COATED ORAL at 08:04

## 2019-04-04 RX ADMIN — METOPROLOL TARTRATE 50 MG: 50 TABLET ORAL at 08:04

## 2019-04-04 RX ADMIN — NITROGLYCERIN 0.4 MG: 0.4 TABLET SUBLINGUAL at 03:04

## 2019-04-04 RX ADMIN — ATORVASTATIN CALCIUM 80 MG: 20 TABLET, FILM COATED ORAL at 08:04

## 2019-04-04 RX ADMIN — ISOSORBIDE MONONITRATE 30 MG: 30 TABLET, EXTENDED RELEASE ORAL at 03:04

## 2019-04-04 RX ADMIN — SODIUM CHLORIDE: 0.9 INJECTION, SOLUTION INTRAVENOUS at 12:04

## 2019-04-04 RX ADMIN — ONDANSETRON 4 MG: 2 INJECTION INTRAMUSCULAR; INTRAVENOUS at 08:04

## 2019-04-04 RX ADMIN — CLOPIDOGREL BISULFATE 75 MG: 75 TABLET, FILM COATED ORAL at 08:04

## 2019-04-04 RX ADMIN — CALCIUM CARBONATE (ANTACID) CHEW TAB 500 MG 500 MG: 500 CHEW TAB at 08:04

## 2019-04-04 RX ADMIN — SODIUM CHLORIDE: 0.9 INJECTION, SOLUTION INTRAVENOUS at 02:04

## 2019-04-04 RX ADMIN — RANOLAZINE 1000 MG: 500 TABLET, FILM COATED, EXTENDED RELEASE ORAL at 08:04

## 2019-04-04 RX ADMIN — AMLODIPINE BESYLATE 10 MG: 10 TABLET ORAL at 03:04

## 2019-04-04 NOTE — DISCHARGE SUMMARY
[facility]  Interventional Cardiology  Discharge Summary      Patient Name: Libia Wynn  MRN: [id number]  Admission Date: 4/3/2019  Hospital Length of Stay: 0 days  Discharge Date and Time:  04/04/2019 2:56 PM  Attending Physician: hTad Kirk MD  Discharging Provider: Alysha Sierra MD  Primary Care Physician: Primary Doctor No    HPI:  Pt is a 49 year old lady who was referred by Dr. Shashank Weir for Brachytherapy      She has a hx DMII. HTN, Obesity and CAD s/p PCI x 8. She had PCI to LCx in 2010 then had PCI to PLB and LAD in 2016. Had ISR of LAD in 2017 and had a 3.0 Xience placed then. In 2018 had chest pain and was noted to have recurrent ISR of mLAD stents as well as a  of PLB stents so she had laser atherectomy and PTCA of her ISR. Had restenosis in September of 2018 with repeat stenting then in November of 2018 had PCTA. She has recurrent chest pain with exertion and in the middle of the night.      Procedure(s) (LRB):  CATHETERIZATION, HEART, LEFT (N/A)     Indwelling Lines/Drains at time of discharge:  Lines/Drains/Airways          None          Hospital Course:  Pt presented for [facility] with PCI and brachytherapy however procedure was aborted due to significant KRIS with Cr up to 3.4. Etiology likely multifactorial (hypotension, medications, dehydration). She was given IV fluids with significant improvement of Cr to 1.4 today. Abdominal ultrasound was done with likely decreased perfusion bilaterally, renal artery duplex was performed today and noted per results. She will be discharged and will need to follow up for PCI.     Consults (From admission, onward)        Status Ordering Provider     Inpatient consult to Nephrology  Once     Provider:  (Not yet assigned)    Completed SONAL CAMPBELL     Inpatient consult to Radiation Oncology  Once     Provider:  (Not yet assigned)    Completed ALYSHA SIERRA            Pending Diagnostic Studies:     None            Discharged  "Condition: good    Follow Up:    Patient Instructions:   No discharge procedures on file.  Medications:  Reconciled Home Medications:      Medication List      CONTINUE taking these medications    albuterol 5 mg/mL nebulizer solution  Commonly known as:  PROVENTIL  Inhale 2.5 mg into the lungs.     amLODIPine 10 MG tablet  Commonly known as:  NORVASC  Take 1 tablet by mouth.     aspirin 81 MG Chew  Take 1 tablet by mouth.     atorvastatin 80 MG tablet  Commonly known as:  LIPITOR  Take 80 mg by mouth.     calcium-vitamin D 250 (625)-125 mg-unit per tablet  Commonly known as:  OSCAL  Take 1 tablet by mouth once daily.     clopidogrel 75 mg tablet  Commonly known as:  PLAVIX  Take 1 tablet by mouth.     JANUMET 50-1,000 mg per tablet  Generic drug:  SITagliptan-metformin  Take 1 tablet by mouth.     LANTUS U-100 INSULIN 100 unit/mL injection  Generic drug:  insulin glargine  Inject into the skin.     metoprolol tartrate 50 MG tablet  Commonly known as:  LOPRESSOR  Take 75 mg by mouth.     NITROSTAT 0.4 MG SL tablet  Generic drug:  nitroGLYCERIN  Place 0.4 mg under the tongue.     promethazine 25 MG tablet  Commonly known as:  PHENERGAN  Take 25 mg by mouth every 6 (six) hours as needed.     RANEXA 1,000 mg Tb12  Generic drug:  ranolazine  Take 500 mg by mouth 2 (two) times daily.     traZODone 50 MG tablet  Commonly known as:  DESYREL  Take 50 mg by mouth nightly as needed.        STOP taking these medications    hydroCHLOROthiazide 25 MG tablet  Commonly known as:  HYDRODIURIL     HYDROcodone-acetaminophen 5-325 mg per tablet  Commonly known as:  NORCO     insulin syringe-needle U-100 0.3 mL 31 gauge x 5/16" Syrg     lancets 33 gauge Misc     lisinopril 40 MG tablet  Commonly known as:  PRINIVIL,ZESTRIL     omeprazole 40 MG capsule  Commonly known as:  PRILOSEC     potassium chloride 10 MEQ Cpsr  Commonly known as:  MICRO-K            Time spent on the discharge of patient: 30 minutes    Alysha Sierra " MD  Interventional Cardiology  [facility]

## 2019-04-04 NOTE — HOSPITAL COURSE
Pt presented for [facility] with PCI and brachytherapy however procedure was aborted due to significant KRIS with Cr up to 3.4. Etiology likely multifactorial (hypotension, medications, dehydration). She was given IV fluids with significant improvement of Cr to 1.4 today. Abdominal ultrasound was done with likely decreased perfusion bilaterally, renal artery duplex was performed today and noted per results. She will be discharged and will need to follow up for PCI.

## 2019-04-04 NOTE — NURSING
"Patient reported c/o lt sided chest pain. Pt stated," It's like a stabbing pain". Pain level 7. Cardiology team notified. Per team patient will be restarted on Metoprolol. /82-HR 80  "

## 2019-04-04 NOTE — PLAN OF CARE
Problem: Adult Inpatient Plan of Care  Goal: Plan of Care Review  Outcome: Ongoing (interventions implemented as appropriate)  Patient AAOx4. Vitals remain stable. Continuous cardiac monitoring in place per MD orders; SR noted. Glucose checked HS per MD orders; no s/s of hypoglycemia noted. NS infusing continuously at rate of 100ml/hr. No complaints of pain this shift. Will continue to monitor.

## 2019-04-04 NOTE — HPI
Pt is a 49 year old lady who was referred by Dr. Shashank Weir for Brachytherapy      She has a hx DMII. HTN, Obesity and CAD s/p PCI x 8. She had PCI to LCx in 2010 then had PCI to PLB and LAD in 2016. Had ISR of LAD in 2017 and had a 3.0 Xience placed then. In 2018 had chest pain and was noted to have recurrent ISR of mLAD stents as well as a  of PLB stents so she had laser atherectomy and PTCA of her ISR. Had restenosis in September of 2018 with repeat stenting then in November of 2018 had PCTA. She has recurrent chest pain with exertion and in the middle of the night.

## 2019-04-05 VITALS
DIASTOLIC BLOOD PRESSURE: 83 MMHG | SYSTOLIC BLOOD PRESSURE: 127 MMHG | WEIGHT: 231.5 LBS | HEIGHT: 63 IN | BODY MASS INDEX: 41.02 KG/M2 | HEART RATE: 72 BPM | TEMPERATURE: 97 F | RESPIRATION RATE: 16 BRPM | OXYGEN SATURATION: 96 %

## 2019-04-05 LAB — POCT GLUCOSE: 145 MG/DL (ref 70–110)

## 2019-04-05 PROCEDURE — 25000003 PHARM REV CODE 250: Performed by: STUDENT IN AN ORGANIZED HEALTH CARE EDUCATION/TRAINING PROGRAM

## 2019-04-05 RX ADMIN — ATORVASTATIN CALCIUM 80 MG: 20 TABLET, FILM COATED ORAL at 08:04

## 2019-04-05 RX ADMIN — CLOPIDOGREL BISULFATE 75 MG: 75 TABLET, FILM COATED ORAL at 08:04

## 2019-04-05 RX ADMIN — ISOSORBIDE MONONITRATE 30 MG: 30 TABLET, EXTENDED RELEASE ORAL at 08:04

## 2019-04-05 RX ADMIN — METOPROLOL TARTRATE 50 MG: 50 TABLET ORAL at 08:04

## 2019-04-05 RX ADMIN — ASPIRIN 81 MG: 81 TABLET, COATED ORAL at 08:04

## 2019-04-05 RX ADMIN — RANOLAZINE 1000 MG: 500 TABLET, FILM COATED, EXTENDED RELEASE ORAL at 08:04

## 2019-04-05 RX ADMIN — AMLODIPINE BESYLATE 10 MG: 10 TABLET ORAL at 08:04

## 2019-04-05 NOTE — PLAN OF CARE
Problem: Adult Inpatient Plan of Care  Goal: Plan of Care Review  Outcome: Ongoing (interventions implemented as appropriate)  Patient AAOx4. Vitals remain stable. Continuous cardiac monitoring in place per MD orders; SR noted. Glucose checked HS per MD orders; no s/s of hypoglycemia noted. No complaints of pain this shift. Nausea and reflux managed with moderate relief. Will continue to monitor.

## 2019-04-05 NOTE — PLAN OF CARE
PCP- DR. JULES ESTRELLA PH# [phone]    PT HAS A RIDE HOME AND FAMILY SUPPORT WITH ADULT SISTER    PHARMACY- [facility] [address] [place] [address]     Coverage Name [employer] Phone     Employer Group   Group Number     Subscriber Name ARTURO HUI Subscriber Number [id number]   Subscriber Date of Birth 1970 Subscriber N    Subscriber Address [address]              04/04/19 1936   Discharge Assessment   Assessment Type Discharge Planning Assessment   Confirmed/corrected address and phone number on facesheet? Yes   Assessment information obtained from? Patient   Expected Length of Stay (days) 3   Communicated expected length of stay with patient/caregiver yes   Prior to hospitilization cognitive status: Alert/Oriented   Prior to hospitalization functional status: Independent   Current cognitive status: Alert/Oriented   Current Functional Status: Independent   Lives With sibling(s)   Able to Return to Prior Arrangements yes   Is patient able to care for self after discharge? Yes   Patient's perception of discharge disposition home or selfcare   Readmission Within the Last 30 Days no previous admission in last 30 days   Patient currently being followed by outpatient case management? No   Patient currently receives any other outside agency services? No   Equipment Currently Used at Home CPAP   Do you have any problems affording any of your prescribed medications? No   Is the patient taking medications as prescribed? yes   Does the patient have transportation home? Yes   Transportation Anticipated family or friend will provide;car, drives self   Does the patient receive services at the Coumadin Clinic? No   Discharge Plan A Home with family   Discharge Plan B Home with family;Home Health   Patient/Family in Agreement with Plan yes

## 2019-04-05 NOTE — PROGRESS NOTES
Progress Note  Nephrology    Admit Date: 4/3/2019   LOS: 0 days     SUBJECTIVE:     Follow-up For:  KRIS    No acute events  BP started to improve  Creatinine decreased to 1.4    Making urine    Review of Systems:  Reports month long symptoms of reflux causing small emesis on off for several days       OBJECTIVE:     Vital Signs (Most Recent)  Temp: 97.4 °F (36.3 °C) (04/04/19 2002)  Pulse: 69 (04/04/19 2337)  Resp: 20 (04/04/19 2258)  BP: 134/88 (04/04/19 2258)  SpO2: 95 % (04/04/19 2258)    Vital Signs Range (Last 24H):  Temp:  [97.4 °F (36.3 °C)-97.6 °F (36.4 °C)]   Pulse:  [69-88]   Resp:  [12-21]   BP: (115-145)/(78-89)   SpO2:  [95 %-97 %]     I & O (Last 24H):    Intake/Output Summary (Last 24 hours) at 4/5/2019 0002  Last data filed at 4/4/2019 2030  Gross per 24 hour   Intake 960 ml   Output --   Net 960 ml     Physical Exam:  NAD  Alert and oriented x 3  Respiration regular  Lungs CTA  S1S2+  No edema    Laboratory:  CBC:  No results for input(s): WBC, RBC, HGB, HCT, PLT in the last 24 hours.  BMP:  Recent Labs   Lab 04/04/19  0802      K 3.7      CO2 24   BUN 24*   CREATININE 1.4   CALCIUM 8.6*      CMP:   Recent Labs   Lab 04/04/19  0802   *   CALCIUM 8.6*   ALBUMIN 3.2*   PROT 6.5      K 3.7   CO2 24      BUN 24*   CREATININE 1.4   ALKPHOS 56   ALT 9*   AST 11   BILITOT 0.7       Diagnostic Results:  Labs: Reviewed  X-Ray: Reviewed  US: Reviewed    ASSESSMENT/PLAN:     Active Hospital Problems    Diagnosis  POA    KRIS (acute kidney injury) [N17.9]  Yes    Atherosclerotic heart disease of native coronary artery with unstable angina pectoris [I25.110]  Yes      Resolved Hospital Problems   No resolved problems to display.     KRIS on CKD II    - KRIS improving, likely was due to volume depletion and hypotension  - she reports having emesis due to severe GERD for the past several days, likely this has contributed to her volume depletion  - BP now improving  - lisinopril has  been held due to KRIS  - would delay restarting it as she could have recurrence of severe symptoms of GERD causing another bout of volume depletion  - if restarted she would need a follow up renal panel in one week and periodic monitoring of renal function  - she has pyuria, microhematuria, urine culture negative, please repeat mid stream and clean catch urinalysis with microscopy   - consider outpatient GI evaluation of her upper GI symptoms

## 2019-04-05 NOTE — PLAN OF CARE
Patient discharged home.  The patient does not have any home needs.  Family provided transportation.  CM completed Medicaid Transportation form.      Future Appointments   Date Time Provider Department Center   5/8/2019  6:00 AM [facility]

## 2019-05-08 ENCOUNTER — HOSPITAL ENCOUNTER (OUTPATIENT)
Facility: HOSPITAL | Age: 49
Discharge: HOME | End: 2019-05-08
Attending: INTERNAL MEDICINE | Admitting: INTERNAL MEDICINE
Payer: MEDICAID

## 2019-05-08 VITALS
RESPIRATION RATE: 18 BRPM | DIASTOLIC BLOOD PRESSURE: 74 MMHG | OXYGEN SATURATION: 90 % | WEIGHT: 230 LBS | TEMPERATURE: 98 F | SYSTOLIC BLOOD PRESSURE: 129 MMHG | HEIGHT: 65 IN | BODY MASS INDEX: 38.32 KG/M2 | HEART RATE: 88 BPM

## 2019-05-08 DIAGNOSIS — I25.110 ATHEROSCLEROSIS OF NATIVE CORONARY ARTERY OF NATIVE HEART WITH UNSTABLE ANGINA PECTORIS: Primary | ICD-10-CM

## 2019-05-08 LAB
ABO + RH BLD: NORMAL
ANION GAP SERPL CALC-SCNC: 9 MMOL/L (ref 8–16)
B-HCG UR QL: NEGATIVE
BASOPHILS # BLD AUTO: 0.05 K/UL (ref 0–0.2)
BASOPHILS NFR BLD: 0.7 % (ref 0–1.9)
BLD GP AB SCN CELLS X3 SERPL QL: NORMAL
BUN SERPL-MCNC: 31 MG/DL (ref 6–20)
CALCIUM SERPL-MCNC: 10.3 MG/DL (ref 8.7–10.5)
CHLORIDE SERPL-SCNC: 102 MMOL/L (ref 95–110)
CO2 SERPL-SCNC: 25 MMOL/L (ref 23–29)
CREAT SERPL-MCNC: 1.6 MG/DL (ref 0.5–1.4)
CTP QC/QA: YES
DIFFERENTIAL METHOD: ABNORMAL
EOSINOPHIL # BLD AUTO: 0.5 K/UL (ref 0–0.5)
EOSINOPHIL NFR BLD: 7.8 % (ref 0–8)
ERYTHROCYTE [DISTWIDTH] IN BLOOD BY AUTOMATED COUNT: 13.8 % (ref 11.5–14.5)
EST. GFR  (AFRICAN AMERICAN): 43 ML/MIN/1.73 M^2
EST. GFR  (NON AFRICAN AMERICAN): 37.3 ML/MIN/1.73 M^2
GLUCOSE SERPL-MCNC: 117 MG/DL (ref 70–110)
HCT VFR BLD AUTO: 34 % (ref 37–48.5)
HGB BLD-MCNC: 11 G/DL (ref 12–16)
IMM GRANULOCYTES # BLD AUTO: 0.03 K/UL (ref 0–0.04)
IMM GRANULOCYTES NFR BLD AUTO: 0.4 % (ref 0–0.5)
INR PPP: 1 (ref 0.8–1.2)
LYMPHOCYTES # BLD AUTO: 2.3 K/UL (ref 1–4.8)
LYMPHOCYTES NFR BLD: 33.7 % (ref 18–48)
MCH RBC QN AUTO: 28.9 PG (ref 27–31)
MCHC RBC AUTO-ENTMCNC: 32.4 G/DL (ref 32–36)
MCV RBC AUTO: 89 FL (ref 82–98)
MONOCYTES # BLD AUTO: 0.6 K/UL (ref 0.3–1)
MONOCYTES NFR BLD: 8.9 % (ref 4–15)
NEUTROPHILS # BLD AUTO: 3.3 K/UL (ref 1.8–7.7)
NEUTROPHILS NFR BLD: 48.5 % (ref 38–73)
NRBC BLD-RTO: 0 /100 WBC
PLATELET # BLD AUTO: 364 K/UL (ref 150–350)
PMV BLD AUTO: 10.3 FL (ref 9.2–12.9)
POCT GLUCOSE: 137 MG/DL (ref 70–110)
POTASSIUM SERPL-SCNC: 3.8 MMOL/L (ref 3.5–5.1)
PROTHROMBIN TIME: 10 SEC (ref 9–12.5)
RBC # BLD AUTO: 3.81 M/UL (ref 4–5.4)
SODIUM SERPL-SCNC: 136 MMOL/L (ref 136–145)
WBC # BLD AUTO: 6.76 K/UL (ref 3.9–12.7)

## 2019-05-08 PROCEDURE — 77470 SPECIAL RADIATION TREATMENT: CPT | Mod: 26,,, | Performed by: RADIOLOGY

## 2019-05-08 PROCEDURE — 77290 PR  SET RADN THERAPY FIELD COMPLEX: ICD-10-PCS | Mod: 26,,, | Performed by: RADIOLOGY

## 2019-05-08 PROCEDURE — 77290 THER RAD SIMULAJ FIELD CPLX: CPT | Mod: 26,,, | Performed by: RADIOLOGY

## 2019-05-08 PROCEDURE — 92974 CATH PLACE CARDIO BRACHYTX: CPT | Mod: LD | Performed by: INTERNAL MEDICINE

## 2019-05-08 PROCEDURE — 99153 MOD SED SAME PHYS/QHP EA: CPT | Performed by: INTERNAL MEDICINE

## 2019-05-08 PROCEDURE — 93010 ELECTROCARDIOGRAM REPORT: CPT | Mod: ,,, | Performed by: INTERNAL MEDICINE

## 2019-05-08 PROCEDURE — 92920 PRQ TRLUML C ANGIOP 1ART&/BR: CPT | Mod: LD | Performed by: INTERNAL MEDICINE

## 2019-05-08 PROCEDURE — 63600175 PHARM REV CODE 636 W HCPCS: Performed by: INTERNAL MEDICINE

## 2019-05-08 PROCEDURE — 93005 ELECTROCARDIOGRAM TRACING: CPT

## 2019-05-08 PROCEDURE — 80048 BASIC METABOLIC PNL TOTAL CA: CPT

## 2019-05-08 PROCEDURE — 77470 PR  SPECIAL RADIATION TREATMENT: ICD-10-PCS | Mod: 26,,, | Performed by: RADIOLOGY

## 2019-05-08 PROCEDURE — C1769 GUIDE WIRE: HCPCS | Performed by: INTERNAL MEDICINE

## 2019-05-08 PROCEDURE — 86850 RBC ANTIBODY SCREEN: CPT

## 2019-05-08 PROCEDURE — 93005 ELECTROCARDIOGRAM TRACING: CPT | Mod: 59

## 2019-05-08 PROCEDURE — 93010 ELECTROCARDIOGRAM REPORT: CPT | Mod: 77,,, | Performed by: INTERNAL MEDICINE

## 2019-05-08 PROCEDURE — 93010 EKG 12-LEAD: ICD-10-PCS | Mod: ,,, | Performed by: INTERNAL MEDICINE

## 2019-05-08 PROCEDURE — 25000003 PHARM REV CODE 250: Performed by: INTERNAL MEDICINE

## 2019-05-08 PROCEDURE — 77290 THER RAD SIMULAJ FIELD CPLX: CPT | Mod: TC | Performed by: RADIOLOGY

## 2019-05-08 PROCEDURE — 99152 PR MOD CONSCIOUS SEDATION, SAME PHYS, 5+ YRS, FIRST 15 MIN: ICD-10-PCS | Mod: ,,, | Performed by: INTERNAL MEDICINE

## 2019-05-08 PROCEDURE — 93454 CORONARY ARTERY ANGIO S&I: CPT | Mod: 59 | Performed by: INTERNAL MEDICINE

## 2019-05-08 PROCEDURE — 77770 HDR RDNCL NTRSTL/ICAV BRCHTX: CPT | Mod: 26,,, | Performed by: RADIOLOGY

## 2019-05-08 PROCEDURE — 77770 PR HDR RDNCL NTRSTL/ICAV BRCHTX 1 CH: ICD-10-PCS | Mod: 26,,, | Performed by: RADIOLOGY

## 2019-05-08 PROCEDURE — 81025 URINE PREGNANCY TEST: CPT | Performed by: INTERNAL MEDICINE

## 2019-05-08 PROCEDURE — 92974 PR TRANSCATH PLACMT,RAD DELIV DEV,CORONARY: ICD-10-PCS | Mod: LD,,, | Performed by: INTERNAL MEDICINE

## 2019-05-08 PROCEDURE — 92974 CATH PLACE CARDIO BRACHYTX: CPT | Mod: LD,,, | Performed by: INTERNAL MEDICINE

## 2019-05-08 PROCEDURE — 93454 PR CATH PLACE/CORONARY ANGIO, IMG SUPER/INTERP: ICD-10-PCS | Mod: 26,59,, | Performed by: INTERNAL MEDICINE

## 2019-05-08 PROCEDURE — 93010 EKG 12-LEAD: ICD-10-PCS | Mod: 77,,, | Performed by: INTERNAL MEDICINE

## 2019-05-08 PROCEDURE — C1887 CATHETER, GUIDING: HCPCS | Performed by: INTERNAL MEDICINE

## 2019-05-08 PROCEDURE — 77470 SPECIAL RADIATION TREATMENT: CPT | Mod: TC | Performed by: RADIOLOGY

## 2019-05-08 PROCEDURE — C1728 CATH, BRACHYTX SEED ADM: HCPCS | Performed by: INTERNAL MEDICINE

## 2019-05-08 PROCEDURE — 77370 RADIATION PHYSICS CONSULT: CPT | Performed by: RADIOLOGY

## 2019-05-08 PROCEDURE — 85025 COMPLETE CBC W/AUTO DIFF WBC: CPT

## 2019-05-08 PROCEDURE — C1725 CATH, TRANSLUMIN NON-LASER: HCPCS | Performed by: INTERNAL MEDICINE

## 2019-05-08 PROCEDURE — 77770 HDR RDNCL NTRSTL/ICAV BRCHTX: CPT | Mod: TC | Performed by: RADIOLOGY

## 2019-05-08 PROCEDURE — 92920 PRQ TRLUML C ANGIOP 1ART&/BR: CPT | Mod: LD,,, | Performed by: INTERNAL MEDICINE

## 2019-05-08 PROCEDURE — 82962 GLUCOSE BLOOD TEST: CPT

## 2019-05-08 PROCEDURE — C1894 INTRO/SHEATH, NON-LASER: HCPCS | Performed by: INTERNAL MEDICINE

## 2019-05-08 PROCEDURE — 92920 PR PTCA: ICD-10-PCS | Mod: LD,,, | Performed by: INTERNAL MEDICINE

## 2019-05-08 PROCEDURE — 99152 MOD SED SAME PHYS/QHP 5/>YRS: CPT | Mod: ,,, | Performed by: INTERNAL MEDICINE

## 2019-05-08 PROCEDURE — 27201423 OPTIME MED/SURG SUP & DEVICES STERILE SUPPLY: Performed by: INTERNAL MEDICINE

## 2019-05-08 PROCEDURE — 93454 CORONARY ARTERY ANGIO S&I: CPT | Mod: 26,59,, | Performed by: INTERNAL MEDICINE

## 2019-05-08 PROCEDURE — 77263 THER RADIOLOGY TX PLNG CPLX: CPT | Mod: ,,, | Performed by: RADIOLOGY

## 2019-05-08 PROCEDURE — 85610 PROTHROMBIN TIME: CPT

## 2019-05-08 PROCEDURE — 25500020 PHARM REV CODE 255: Performed by: INTERNAL MEDICINE

## 2019-05-08 PROCEDURE — 99152 MOD SED SAME PHYS/QHP 5/>YRS: CPT | Performed by: INTERNAL MEDICINE

## 2019-05-08 PROCEDURE — 77263 PR  RADIATION THERAPY PLAN COMPLEX: ICD-10-PCS | Mod: ,,, | Performed by: RADIOLOGY

## 2019-05-08 RX ORDER — GLUCAGON 1 MG
1 KIT INJECTION
Status: DISCONTINUED | OUTPATIENT
Start: 2019-05-08 | End: 2019-05-08 | Stop reason: HOSPADM

## 2019-05-08 RX ORDER — LIDOCAINE HYDROCHLORIDE 20 MG/ML
INJECTION, SOLUTION EPIDURAL; INFILTRATION; INTRACAUDAL; PERINEURAL
Status: DISCONTINUED | OUTPATIENT
Start: 2019-05-08 | End: 2019-05-08 | Stop reason: HOSPADM

## 2019-05-08 RX ORDER — SODIUM CHLORIDE 9 MG/ML
3 INJECTION, SOLUTION INTRAVENOUS CONTINUOUS
Status: ACTIVE | OUTPATIENT
Start: 2019-05-08 | End: 2019-05-08

## 2019-05-08 RX ORDER — METOPROLOL TARTRATE 1 MG/ML
INJECTION, SOLUTION INTRAVENOUS
Status: DISCONTINUED | OUTPATIENT
Start: 2019-05-08 | End: 2019-05-08 | Stop reason: HOSPADM

## 2019-05-08 RX ORDER — IBUPROFEN 200 MG
24 TABLET ORAL
Status: DISCONTINUED | OUTPATIENT
Start: 2019-05-08 | End: 2019-05-08 | Stop reason: HOSPADM

## 2019-05-08 RX ORDER — NITROGLYCERIN 5 MG/ML
INJECTION, SOLUTION INTRAVENOUS
Status: DISCONTINUED | OUTPATIENT
Start: 2019-05-08 | End: 2019-05-08 | Stop reason: HOSPADM

## 2019-05-08 RX ORDER — IBUPROFEN 200 MG
16 TABLET ORAL
Status: DISCONTINUED | OUTPATIENT
Start: 2019-05-08 | End: 2019-05-08 | Stop reason: HOSPADM

## 2019-05-08 RX ORDER — INSULIN ASPART 100 [IU]/ML
0-5 INJECTION, SOLUTION INTRAVENOUS; SUBCUTANEOUS
Status: DISCONTINUED | OUTPATIENT
Start: 2019-05-08 | End: 2019-05-08 | Stop reason: HOSPADM

## 2019-05-08 RX ORDER — HEPARIN SODIUM 1000 [USP'U]/ML
INJECTION, SOLUTION INTRAVENOUS; SUBCUTANEOUS
Status: DISCONTINUED | OUTPATIENT
Start: 2019-05-08 | End: 2019-05-08 | Stop reason: HOSPADM

## 2019-05-08 RX ORDER — MIDAZOLAM HYDROCHLORIDE 1 MG/ML
INJECTION, SOLUTION INTRAMUSCULAR; INTRAVENOUS
Status: DISCONTINUED | OUTPATIENT
Start: 2019-05-08 | End: 2019-05-08 | Stop reason: HOSPADM

## 2019-05-08 RX ORDER — DIPHENHYDRAMINE HCL 25 MG
50 CAPSULE ORAL ONCE
Status: COMPLETED | OUTPATIENT
Start: 2019-05-08 | End: 2019-05-08

## 2019-05-08 RX ORDER — HEPARIN SODIUM 200 [USP'U]/100ML
INJECTION, SOLUTION INTRAVENOUS
Status: DISCONTINUED | OUTPATIENT
Start: 2019-05-08 | End: 2019-05-08 | Stop reason: HOSPADM

## 2019-05-08 RX ORDER — FENTANYL CITRATE 50 UG/ML
INJECTION, SOLUTION INTRAMUSCULAR; INTRAVENOUS
Status: DISCONTINUED | OUTPATIENT
Start: 2019-05-08 | End: 2019-05-08 | Stop reason: HOSPADM

## 2019-05-08 RX ADMIN — SODIUM CHLORIDE 3 ML/KG/HR: 0.9 INJECTION, SOLUTION INTRAVENOUS at 08:05

## 2019-05-08 RX ADMIN — DIPHENHYDRAMINE HYDROCHLORIDE 50 MG: 25 CAPSULE ORAL at 08:05

## 2019-05-08 NOTE — H&P
[facility]  Interventional Cardiology  H&P    Patient Name: Libia Wynn  MRN: [id number]  Admission Date: 5/8/2019  Code Status: No Order   Attending Provider: Thad Kirk MD   Primary Care Physician: Primary Doctor No  Principal Problem:<principal problem not specified>    Patient information was obtained from patient, past medical records and ER records.     Subjective:     Referring Physician: Dr. Shashank Weir.  HPI:  Ms. Wynn is a 49 year old lady who was referred by Dr. Shashank Weir for Brachytherapy.      She has a PMHx DM2, HTN, Obesity and CAD s/p PCI x 8. She had PCI to LCx in 2010 then had PCI to PLB and LAD in 2016. Had ISR of LAD in 2017 and had a 3.0 Xience placed then. In 2018 had chest pain and was noted to have recurrent ISR of mLAD stents as well as a  of PLB stents so she had laser atherectomy and PTCA of her ISR. Had restenosis in September of 2018 with repeat stenting then in November of 2018 had PCTA. She has recurrent chest pain with exertion and in the middle of the night.    She was scheduled for bracytherapy in early April but presented with an KRIS with Cr 3.3. Procedure was deferred and pateint received IV hydration with improvement of Cr to 1.4. After discharge home she experienced chest pain around [facility] and went to an OSH where she had POBA of her ISR. She now presents today for PCI brachytherapy.    Past Medical History:   Diagnosis Date    Arthritis     COPD (chronic obstructive pulmonary disease)     Coronary artery disease     Diabetes mellitus     Hypertension      Past Surgical History:   Procedure Laterality Date    CARDIAC CATHETERIZATION       Review of patient's allergies indicates:  No Known Allergies    PTA Medications   Medication Sig    albuterol (PROVENTIL) 5 mg/mL nebulizer solution Inhale 2.5 mg into the lungs.    amLODIPine (NORVASC) 10 MG tablet Take 1 tablet by mouth.    aspirin 81 MG Chew Take 1 tablet by mouth.     atorvastatin (LIPITOR) 80 MG tablet Take 80 mg by mouth.    calcium-vitamin D (OSCAL) 250 (625)-125 mg-unit per tablet Take 1 tablet by mouth once daily.    clopidogrel (PLAVIX) 75 mg tablet Take 1 tablet by mouth.    metoprolol tartrate (LOPRESSOR) 50 MG tablet Take 75 mg by mouth.    nitroGLYCERIN (NITROSTAT) 0.4 MG SL tablet Place 0.4 mg under the tongue.    ranolazine (RANEXA) 1,000 mg Tb12 Take 500 mg by mouth 2 (two) times daily.    traZODone (DESYREL) 50 MG tablet Take 50 mg by mouth nightly as needed.    insulin glargine (LANTUS U-100 INSULIN) 100 unit/mL injection Inject into the skin.    promethazine (PHENERGAN) 25 MG tablet Take 25 mg by mouth every 6 (six) hours as needed.    SITagliptan-metformin (JANUMET) 50-1,000 mg per tablet Take 1 tablet by mouth.     Family History     None        Tobacco Use    Smoking status: Former Smoker     Last attempt to quit: 2017     Years since quittin.0    Smokeless tobacco: Never Used   Substance and Sexual Activity    Alcohol use: Never     Frequency: Never    Drug use: Never    Sexual activity: Not on file     Review of Systems   Constitution: Negative for chills, fever and weight loss.   HENT: Negative for sore throat and stridor.    Eyes: Negative for blurred vision, double vision and pain.   Cardiovascular: Positive for chest pain and dyspnea on exertion. Negative for claudication, near-syncope, orthopnea, palpitations, paroxysmal nocturnal dyspnea and syncope.   Respiratory: Negative for cough, shortness of breath, sputum production and wheezing.    Endocrine: Negative for polydipsia, polyphagia and polyuria.   Skin: Negative for rash.   Musculoskeletal: Negative for joint pain, joint swelling and myalgias.   Gastrointestinal: Negative for abdominal pain, constipation, diarrhea, heartburn, melena, nausea and vomiting.   Genitourinary: Negative for dysuria and hematuria.   Neurological: Negative for focal weakness and loss of balance.    Psychiatric/Behavioral: Negative for depression and memory loss.     Objective:     Vital Signs (Most Recent):    Vital Signs (24h Range):         There is no height or weight on file to calculate BMI.        No intake or output data in the 24 hours ending 05/08/19 0757    Lines/Drains/Airways     Peripheral Intravenous Line                 Peripheral IV - Single Lumen 04/03/19 0843 Left Forearm 34 days         Peripheral IV - Single Lumen 04/03/19 0844 Right Forearm 34 days              Physical Exam   Constitutional: She is oriented to person, place, and time. She appears well-developed and well-nourished.   HENT:   Head: Normocephalic and atraumatic.   Eyes: Pupils are equal, round, and reactive to light.   Neck: Normal range of motion. No JVD present.   Cardiovascular: Normal rate and regular rhythm.   No murmur heard.  Pulmonary/Chest: Effort normal and breath sounds normal. She has no wheezes. She has no rales.   Abdominal: Soft. Bowel sounds are normal. She exhibits no distension. There is no tenderness.   Neurological: She is alert and oriented to person, place, and time.   Skin: Skin is warm and dry.     Significant Labs: In process this AM.    Assessment and Plan:     Atherosclerotic heart disease of native coronary artery with unstable angina pectoris  - CAD with multiple PCI's and multiple episodes of recurrent ISR  - C today with likely PCI and brachytherapy, patient is a ANIRUDH candidate  - Anti-platelet Therapy: ASA 81 mg Daily and Clopidogrel 75 mg Daily  - Access: R Radial  - Creatinine/CrCl: 1.4 on 4/4/2019, pending this AM  - Allergies: No shellfish/Iodine allergy  - Pre-Hydration: 3 cc/kg/hr IV, continuous, for 1 hour, Pre-Procedure  - Pre-Op Med: Diphenhydramine (Benadryl) 50 mg, Oral, Once, Pre-Procedure   - All patient's questions were answered.  - The risks, benefits & alternatives of the procedure were explained to the patient.   - The risks of coronary angiography include but are not  limited to: Bleeding, infection, heart rhythm abnormalities, allergic reactions, kidney injury requiring dilaysis, limb loss, stroke and death   - Should stenting be indicated, the patient has agreed to dual anti-platelet therapy for 1-consecutive year with a drug-eluting stent and a minimum of 1-month with the use of a bare metal stent  - Additionally, pt is aware that non-compliance is likely to result in stent clotting with heart attack, heart failure, and/or death  - The risks of moderate sedation include hypotension, respiratory depression, arrhythmias, bronchospasm, & death  - Informed consents in media, the patient is agreeable to proceed with the procedure      Willard Hoyos MD  Interventional Cardiology   [facility]

## 2019-05-08 NOTE — NURSING
Vasc band removed per protocol.  No bleed or hematoma observed.  IVF continued.  Ambulated in unit around nurses station.

## 2019-05-08 NOTE — PLAN OF CARE
Problem: Adult Inpatient Plan of Care  Goal: Plan of Care Review  Outcome: Ongoing (interventions implemented as appropriate)  Received report from KELSIE Villavicencio. Patient s/p [facility], AAOx3. VSS, no c/o pain or discomfort at this time, resp even and unlabored. Vasc Band dressing to R wrist is CDI. No active bleeding. No hematoma noted. Post procedure protocol reviewed with patient. Understanding verbalized. Family members called to bedside. Nurse call bell within reach. Will continue to monitor per post procedure protocol.

## 2019-05-08 NOTE — SUBJECTIVE & OBJECTIVE
Past Medical History:   Diagnosis Date    Arthritis     COPD (chronic obstructive pulmonary disease)     Coronary artery disease     Diabetes mellitus     Hypertension      Past Surgical History:   Procedure Laterality Date    CARDIAC CATHETERIZATION       Review of patient's allergies indicates:  No Known Allergies    PTA Medications   Medication Sig    albuterol (PROVENTIL) 5 mg/mL nebulizer solution Inhale 2.5 mg into the lungs.    amLODIPine (NORVASC) 10 MG tablet Take 1 tablet by mouth.    aspirin 81 MG Chew Take 1 tablet by mouth.    atorvastatin (LIPITOR) 80 MG tablet Take 80 mg by mouth.    calcium-vitamin D (OSCAL) 250 (625)-125 mg-unit per tablet Take 1 tablet by mouth once daily.    clopidogrel (PLAVIX) 75 mg tablet Take 1 tablet by mouth.    metoprolol tartrate (LOPRESSOR) 50 MG tablet Take 75 mg by mouth.    nitroGLYCERIN (NITROSTAT) 0.4 MG SL tablet Place 0.4 mg under the tongue.    ranolazine (RANEXA) 1,000 mg Tb12 Take 500 mg by mouth 2 (two) times daily.    traZODone (DESYREL) 50 MG tablet Take 50 mg by mouth nightly as needed.    insulin glargine (LANTUS U-100 INSULIN) 100 unit/mL injection Inject into the skin.    promethazine (PHENERGAN) 25 MG tablet Take 25 mg by mouth every 6 (six) hours as needed.    SITagliptan-metformin (JANUMET) 50-1,000 mg per tablet Take 1 tablet by mouth.     Family History     None        Tobacco Use    Smoking status: Former Smoker     Last attempt to quit: 2017     Years since quittin.0    Smokeless tobacco: Never Used   Substance and Sexual Activity    Alcohol use: Never     Frequency: Never    Drug use: Never    Sexual activity: Not on file     Review of Systems   Constitution: Negative for chills, fever and weight loss.   HENT: Negative for sore throat and stridor.    Eyes: Negative for blurred vision, double vision and pain.   Cardiovascular: Positive for chest pain and dyspnea on exertion. Negative for claudication, near-syncope,  orthopnea, palpitations, paroxysmal nocturnal dyspnea and syncope.   Respiratory: Negative for cough, shortness of breath, sputum production and wheezing.    Endocrine: Negative for polydipsia, polyphagia and polyuria.   Skin: Negative for rash.   Musculoskeletal: Negative for joint pain, joint swelling and myalgias.   Gastrointestinal: Negative for abdominal pain, constipation, diarrhea, heartburn, melena, nausea and vomiting.   Genitourinary: Negative for dysuria and hematuria.   Neurological: Negative for focal weakness and loss of balance.   Psychiatric/Behavioral: Negative for depression and memory loss.     Objective:     Vital Signs (Most Recent):    Vital Signs (24h Range):         There is no height or weight on file to calculate BMI.        No intake or output data in the 24 hours ending 05/08/19 0757    Lines/Drains/Airways     Peripheral Intravenous Line                 Peripheral IV - Single Lumen 04/03/19 0843 Left Forearm 34 days         Peripheral IV - Single Lumen 04/03/19 0844 Right Forearm 34 days              Physical Exam   Constitutional: She is oriented to person, place, and time. She appears well-developed and well-nourished.   HENT:   Head: Normocephalic and atraumatic.   Eyes: Pupils are equal, round, and reactive to light.   Neck: Normal range of motion. No JVD present.   Cardiovascular: Normal rate and regular rhythm.   No murmur heard.  Pulmonary/Chest: Effort normal and breath sounds normal. She has no wheezes. She has no rales.   Abdominal: Soft. Bowel sounds are normal. She exhibits no distension. There is no tenderness.   Neurological: She is alert and oriented to person, place, and time.   Skin: Skin is warm and dry.     Significant Labs: In process this AM.

## 2019-05-08 NOTE — DISCHARGE SUMMARY
Discharge Summary  Interventional Cardiology      Admit Date: 5/8/2019    Discharge Date:  5/8/2019    Attending Physician: Thad Kirk MD    Discharge Physician: Willard Hoyos MD    Principal Diagnoses: Atherosclerotic heart disease of native coronary artery with unstable angina pectoris  Indication for Admission: Left heart cath (Right), PTCA, Brachytherapy, Coronary    Discharged Condition: Good    Hospital Course:   Patient presented for outpatient [facility] which went without complication. Patient underwent PCI and brachytherapy to mid LAD ISR with 0% residual stenosis. See full cath report in Epic for details. Hemostasis of patient's R radial access site was achieved with VascBand. Patient was monitored post-procedure per protocol, and her access site was c/d/i with no evidence of hematoma this afternoon. She was feeling well and anticipating discharge home today.    Outpatient Plan:  - Continue medical management  - Plavix for at least 1 year and ASA 81 mg indefinitely  - Follow-up with outpatient cardiologist (Dr. Shashank Weir)    Diet: Cardiac diet    Activity: Ad genna, wound care instructions provided    Disposition: Home or Self Care    Discharge Medications:      Medication List      CONTINUE taking these medications    albuterol 5 mg/mL nebulizer solution  Commonly known as:  PROVENTIL     amLODIPine 10 MG tablet  Commonly known as:  NORVASC     aspirin 81 MG Chew     atorvastatin 80 MG tablet  Commonly known as:  LIPITOR     calcium-vitamin D 250 (625)-125 mg-unit per tablet  Commonly known as:  OSCAL     clopidogrel 75 mg tablet  Commonly known as:  PLAVIX     JANUMET 50-1,000 mg per tablet  Generic drug:  SITagliptan-metformin     LANTUS U-100 INSULIN 100 unit/mL injection  Generic drug:  insulin glargine     metoprolol tartrate 50 MG tablet  Commonly known as:  LOPRESSOR     NITROSTAT 0.4 MG SL tablet  Generic drug:  nitroGLYCERIN     promethazine 25 MG tablet  Commonly known as:  PHENERGAN      RANEXA 1,000 mg Tb12  Generic drug:  ranolazine     traZODone 50 MG tablet  Commonly known as:  DESYREL          Follow Up:  Follow-up Information     Shashank Weir MD.    Specialty:  Cardiovascular Disease  Contact information:  [address]  [address] [address]  [phone]

## 2019-05-08 NOTE — Clinical Note
12 ml injected throughout the case. 63 mL total wasted during the case. 75 mL total used in the case.

## 2019-05-08 NOTE — HPI
Ms. Wynn is a 49 year old lady who was referred by Dr. Shashank Weir for Brachytherapy.      She has a PMHx DM2, HTN, Obesity and CAD s/p PCI x 8. She had PCI to LCx in 2010 then had PCI to PLB and LAD in 2016. Had ISR of LAD in 2017 and had a 3.0 Xience placed then. In 2018 had chest pain and was noted to have recurrent ISR of mLAD stents as well as a  of PLB stents so she had laser atherectomy and PTCA of her ISR. Had restenosis in September of 2018 with repeat stenting then in November of 2018 had PCTA. She has recurrent chest pain with exertion and in the middle of the night.    She was scheduled for bracytherapy in early April but presented with an KRIS with Cr 3.3. Procedure was deferred and pateint received IV hydration with improvement of Cr to 1.4. After discharge home she experienced chest pain around [facility] and went to an OSH where she had POBA of her ISR. She now presents today for PCI brachytherapy.

## 2019-05-08 NOTE — Clinical Note
Catheter is inserted into the ostium   left main. Angiography performed of the left coronary arteries.

## 2019-05-08 NOTE — CONSULTS
"Cardiac Rehab     Libia Wynn   [id number]   5/8/2019       Activity taught: Yes    Cardiac Rehab Phase Taught: Phase I & II    Risk Factors-Modifiable: diabetes, nutrition, hypertension, obesity, sedentary lifestyle, stress, exercise    Risk Factors-Non modifiable: age, race    Teaching Method: Verbal, Written and Living with Heart Disease book.    Understanding/Response: Pt encouraged to follow up with her physician at home regarding cardiac rehab services as insurance does not provide these services. Pt verbalized understanding, all questions answered.     Comments: S/P Brachytherapy. Discussed cardiac rehab and risk factor modification. Educational materials were used in the process and given to the patient. They included "Your Guide to Living with Heart Disease", Phase Two Cardiac Rehabilitation information along with a sample Mediterranean diet.The patient expressed understanding of the teaching and expressed desire to take a role in modifying the risk factors when they return home.    KEERTHI Darling RN  Cardiac Rehab Nurse      "

## 2019-05-08 NOTE — Clinical Note
Catheter is inserted into the distal   left anterior descending. BRACHYTHERAPY PERFORMED IN DISTAL AND MID LAD. DOSE: 18.4Gy, TIME: 4min 32sec

## 2019-05-08 NOTE — ASSESSMENT & PLAN NOTE
- CAD with multiple PCI's and multiple episodes of recurrent ISR  - [facility] today with likely PCI and brachytherapy, patient is a ANIRUDH candidate  - Anti-platelet Therapy: ASA 81 mg Daily and Clopidogrel 75 mg Daily  - Access: R Radial  - Creatinine/CrCl: 1.4 on 4/4/2019, pending this AM  - Allergies: No shellfish/Iodine allergy  - Pre-Hydration: 3 cc/kg/hr IV, continuous, for 1 hour, Pre-Procedure  - Pre-Op Med: Diphenhydramine (Benadryl) 50 mg, Oral, Once, Pre-Procedure   - All patient's questions were answered.  - The risks, benefits & alternatives of the procedure were explained to the patient.   - The risks of coronary angiography include but are not limited to: Bleeding, infection, heart rhythm abnormalities, allergic reactions, kidney injury requiring dilaysis, limb loss, stroke and death   - Should stenting be indicated, the patient has agreed to dual anti-platelet therapy for 1-consecutive year with a drug-eluting stent and a minimum of 1-month with the use of a bare metal stent  - Additionally, pt is aware that non-compliance is likely to result in stent clotting with heart attack, heart failure, and/or death  - The risks of moderate sedation include hypotension, respiratory depression, arrhythmias, bronchospasm, & death  - Informed consents in media, the patient is agreeable to proceed with the procedure

## 2019-05-08 NOTE — NURSING
Discharge instructions and medlist given.  Patient and spouse verbalized understanding.  Escorted off unit via wheelchair with RN pushing.  Waiting out front of hospital for transportation which has been set up to drive them home to [place].  Confirmation code is [id number].

## 2019-05-08 NOTE — BRIEF OP NOTE
"    Post Cath Note  Referring Physician: Thad Kirk MD  Procedure: Left heart cath (Right), PTCA, Brachytherapy, Coronary       Access: Right radial    90% mid LAD ISR    See full report for further details    Intervention:     PCI to LAD ISR with cutting balloon, followed by brachytherapy with residual 0% stenosis.    Closure device: Radial band    Post Cath Exam:   /74   Pulse 88   Temp 97.8 °F (36.6 °C) (Oral)   Resp 18   Ht 5' 5" (1.651 m)   Wt 104.3 kg (230 lb)   LMP 04/08/2019   SpO2 (!) 90%   Breastfeeding? No   BMI 38.27 kg/m²   No unusual pain, hematoma, thrill or bruit at vascular access site.  Distal pulse present without signs of ischemia.    Recommendations:   - Routine post-cath care  - IVF at 3 cc/kg/hr for 4 hrs  - Continue medical management  - Plavix for at least 1 year and ASA 81 mg indefinitely  - Follow-up with outpatient cardiologist (Dr. Shashank Weir)    Signed:  Willard Hoyos MD  Cardiology Fellow, PGY-6  5/8/2019 10:03 AM  "

## 2021-02-11 PROBLEM — T21.12XA: Status: ACTIVE | Noted: 2021-02-11

## 2021-02-11 PROBLEM — T21.21XA: Status: ACTIVE | Noted: 2021-02-11

## 2021-05-12 ENCOUNTER — PATIENT MESSAGE (OUTPATIENT)
Dept: RESEARCH | Facility: HOSPITAL | Age: 51
End: 2021-05-12
